# Patient Record
Sex: FEMALE | Race: ASIAN | NOT HISPANIC OR LATINO | Employment: FULL TIME | URBAN - METROPOLITAN AREA
[De-identification: names, ages, dates, MRNs, and addresses within clinical notes are randomized per-mention and may not be internally consistent; named-entity substitution may affect disease eponyms.]

---

## 2017-01-30 ENCOUNTER — MYC MEDICAL ADVICE (OUTPATIENT)
Dept: FAMILY MEDICINE | Facility: CLINIC | Age: 22
End: 2017-01-30

## 2017-01-30 DIAGNOSIS — R21 FACIAL RASH: Primary | ICD-10-CM

## 2017-02-01 NOTE — TELEPHONE ENCOUNTER
Gayathri,   Please see Hiptypet message below and advise if you would like pt to be seen, or if you can provide allergy referral.     Mayte Jenkins RN  [facility]

## 2017-02-07 ENCOUNTER — TRANSFERRED RECORDS (OUTPATIENT)
Dept: HEALTH INFORMATION MANAGEMENT | Facility: CLINIC | Age: 22
End: 2017-02-07

## 2017-02-23 ENCOUNTER — OFFICE VISIT (OUTPATIENT)
Dept: FAMILY MEDICINE | Facility: CLINIC | Age: 22
End: 2017-02-23
Payer: COMMERCIAL

## 2017-02-23 VITALS
WEIGHT: 148 LBS | OXYGEN SATURATION: 99 % | DIASTOLIC BLOOD PRESSURE: 80 MMHG | TEMPERATURE: 97.4 F | HEART RATE: 61 BPM | BODY MASS INDEX: 24.66 KG/M2 | SYSTOLIC BLOOD PRESSURE: 111 MMHG

## 2017-02-23 DIAGNOSIS — R07.0 THROAT PAIN: Primary | ICD-10-CM

## 2017-02-23 LAB
DEPRECATED S PYO AG THROAT QL EIA: NORMAL
HETEROPH AB SER QL: NEGATIVE
MICRO REPORT STATUS: NORMAL
SPECIMEN SOURCE: NORMAL

## 2017-02-23 PROCEDURE — 36415 COLL VENOUS BLD VENIPUNCTURE: CPT | Performed by: NURSE PRACTITIONER

## 2017-02-23 PROCEDURE — 87081 CULTURE SCREEN ONLY: CPT | Performed by: NURSE PRACTITIONER

## 2017-02-23 PROCEDURE — 87880 STREP A ASSAY W/OPTIC: CPT | Performed by: NURSE PRACTITIONER

## 2017-02-23 PROCEDURE — 99213 OFFICE O/P EST LOW 20 MIN: CPT | Performed by: NURSE PRACTITIONER

## 2017-02-23 PROCEDURE — 86308 HETEROPHILE ANTIBODY SCREEN: CPT | Performed by: NURSE PRACTITIONER

## 2017-02-23 NOTE — PROGRESS NOTES
SUBJECTIVE:                                                    Jed Krishna is a 21 year old female who presents to clinic today for the following health issues:      Acute Illness   Acute illness concerns: sore throat  Onset: 1 week    Fever: no     Chills/Sweats: YES- sweats    Headache (location?): YES    Sinus Pressure:YES    Conjunctivitis:  no    Ear Pain: YES- on and off    Rhinorrhea: no     Congestion: no     Sore Throat: YES     Cough: YES    Wheeze: no     Decreased Appetite: YES    Nausea: no     Vomiting: no     Diarrhea:  no     Dysuria/Freq.: no     Fatigue/Achiness: YES    Sick/Strep Exposure: no      Therapies Tried and outcome: Advil and Nyquil       Problem list and histories reviewed & adjusted, as indicated.  Additional history: as documented    Problem list, Medication list, Allergies, and Medical/Social/Surgical histories reviewed in EPIC and updated as appropriate.    ROS:  ROS:5 point ROS including CONST, HEENT, Respiratory, CV, and GI other than that noted in the HPI,  is negative       OBJECTIVE:                                                    /80 (BP Location: Right arm, Patient Position: Chair, Cuff Size: Adult Regular)  Pulse 61  Temp 97.4  F (36.3  C) (Oral)  Wt 148 lb (67.1 kg)  SpO2 99%  BMI 24.66 kg/m2  Body mass index is 24.66 kg/(m^2).  GENERAL: alert and fatigued  EYES: Eyes grossly normal to inspection, PERRL and conjunctivae and sclerae normal  HENT: normal cephalic/atraumatic, both ears: clear effusion, nose and mouth without ulcers or lesions, nasal mucosa edematous , oropharynx clear, oral mucous membranes moist, tonsillar hypertrophy and tonsillar erythema  NECK: bilateral anterior and posterior cervical adenopathy, no asymmetry, masses, or scars and thyroid normal to palpation  RESP: lungs clear to auscultation - no rales, rhonchi or wheezes  CV: regular rate and rhythm, normal S1 S2, no S3 or S4, no murmur, click or rub, no peripheral edema and peripheral  pulses strong    Diagnostic Test Results:  Results for orders placed or performed in visit on 02/23/17 (from the past 24 hour(s))   Strep, Rapid Screen   Result Value Ref Range    Specimen Description Throat     Rapid Strep A Screen       NEGATIVE: No Group A streptococcal antigen detected by immunoassay, await   culture report.      Micro Report Status FINAL 02/23/2017    Mononucleosis screen   Result Value Ref Range    Mononucleosis Screen Negative NEG        ASSESSMENT/PLAN:                                                      (R07.0) Throat pain  (primary encounter diagnosis)  Comment:   Plan: Strep, Rapid Screen, Mononucleosis screen, Beta        strep group A culture              Patient Instructions   1.  Push fluids - drink 80 oz a day  2.  Saline nasal rinse - saline spray or rinse like a neti pot  3.  Humidifier - could put eucalyptus or peppermint or rosemary essential oil in the humidifier water  4.  Steamy bathroom  - breathe in the steamy air  5.  Peg's Soother - Tea Source or at [employer] bulk    Whole 30 or alternative elimination diet      WIL Pérez [facility]

## 2017-02-23 NOTE — PATIENT INSTRUCTIONS
1.  Push fluids - drink 80 oz a day  2.  Saline nasal rinse - saline spray or rinse like a neti pot  3.  Humidifier - could put eucalyptus or peppermint or rosemary essential oil in the humidifier water  4.  Steamy bathroom  - breathe in the steamy air  5.  Peg's Soother - Tea Source or at [employer] bulk    Whole 30 or alternative elimination diet

## 2017-02-23 NOTE — MR AVS SNAPSHOT
After Visit Summary   2/23/2017    Jed Krishna    MRN: [id number]           Patient Information     Date Of Birth          1995        Visit Information        Provider Department      2/23/2017 3:45 PM Open, Assignments; Adeline Horta APRN CNP [facility] [place] [facility]        Today's Diagnoses     Throat pain    -  1      Care Instructions    1.  Push fluids - drink 80 oz a day  2.  Saline nasal rinse - saline spray or rinse like a neti pot  3.  Humidifier - could put eucalyptus or peppermint or rosemary essential oil in the humidifier water  4.  Steamy bathroom  - breathe in the steamy air  5.  Peg's Soother - Tea Source or at [employer] bulk    Whole 30 or alternative elimination diet        Follow-ups after your visit        Who to contact     If you have questions or need follow up information about today's clinic visit or your schedule please contact [facility] directly at [phone].  Normal or non-critical lab and imaging results will be communicated to you by Red Arilhart, letter or phone within 4 business days after the clinic has received the results. If you do not hear from us within 7 days, please contact the clinic through [employer] or phone. If you have a critical or abnormal lab result, we will notify you by phone as soon as possible.  Submit refill requests through uBeam or call your pharmacy and they will forward the refill request to us. Please allow 3 business days for your refill to be completed.          Additional Information About Your Visit        Red Arilhart Information     uBeam gives you secure access to your electronic health record. If you see a primary care provider, you can also send messages to your care team and make appointments. If you have questions, please call your primary care clinic.  If you do not have a primary care provider, please call [phone] and they will assist you.        Care EveryWhere ID     This is your Care  EveryWhere ID. This could be used by other organizations to access your [place] medical records  [id number]        Your Vitals Were     Pulse Temperature Pulse Oximetry BMI (Body Mass Index)          61 97.4  F (36.3  C) (Oral) 99% 24.66 kg/m2         Blood Pressure from Last 3 Encounters:   02/23/17 111/80   11/03/16 100/61   10/09/16 102/60    Weight from Last 3 Encounters:   02/23/17 148 lb (67.1 kg)   11/03/16 138 lb 4.8 oz (62.7 kg)   10/09/16 140 lb (63.5 kg)              We Performed the Following     Beta strep group A culture     Mononucleosis screen     Strep, Rapid Screen        Primary Care Provider    None Specified       No primary provider on file.        Thank you!     Thank you for choosing [facility]  for your care. Our goal is always to provide you with excellent care. Hearing back from our patients is one way we can continue to improve our services. Please take a few minutes to complete the written survey that you may receive in the mail after your visit with us. Thank you!             Your Updated Medication List - Protect others around you: Learn how to safely use, store and throw away your medicines at www.disposemymeds.org.      Notice  As of 2/23/2017  4:42 PM    You have not been prescribed any medications.

## 2017-02-23 NOTE — LETTER
[facility]  [address] [place] [address]  Phone: [phone]  Fax: [phone]    February 23, 2017        Jed Krishna  [address] [facility] [address]          To whom it may concern:    RE: Jed Krishna    Patient was seen and treated today at our clinic and missed school    Please contact me for questions or concerns.      Sincerely,        WIL Pérez CNP

## 2017-02-25 LAB
BACTERIA SPEC CULT: NORMAL
MICRO REPORT STATUS: NORMAL
SPECIMEN SOURCE: NORMAL

## 2017-03-16 ENCOUNTER — OFFICE VISIT (OUTPATIENT)
Dept: MIDWIFE SERVICES | Facility: CLINIC | Age: 22
End: 2017-03-16
Payer: COMMERCIAL

## 2017-03-16 VITALS
HEART RATE: 65 BPM | WEIGHT: 143 LBS | DIASTOLIC BLOOD PRESSURE: 63 MMHG | BODY MASS INDEX: 23.83 KG/M2 | SYSTOLIC BLOOD PRESSURE: 99 MMHG

## 2017-03-16 DIAGNOSIS — Z30.09 BIRTH CONTROL COUNSELING: Primary | ICD-10-CM

## 2017-03-16 DIAGNOSIS — Z00.00 ENCOUNTER FOR ROUTINE ADULT HEALTH EXAMINATION WITHOUT ABNORMAL FINDINGS: ICD-10-CM

## 2017-03-16 LAB — BETA HCG QUAL IFA URINE: NEGATIVE

## 2017-03-16 PROCEDURE — 84703 CHORIONIC GONADOTROPIN ASSAY: CPT | Performed by: ADVANCED PRACTICE MIDWIFE

## 2017-03-16 PROCEDURE — 99201 ZZC OFFICE/OUTPT VISIT, NEW, LEVEL I: CPT | Performed by: ADVANCED PRACTICE MIDWIFE

## 2017-03-16 RX ORDER — MULTIPLE VITAMINS W/ MINERALS TAB 9MG-400MCG
1 TAB ORAL EVERY MORNING
Qty: 100 TABLET | Refills: 0 | Status: ON HOLD | COMMUNITY
Start: 2017-03-16 | End: 2023-06-19

## 2017-03-16 NOTE — NURSING NOTE
"Chief Complaint   Patient presents with     IUD       Initial BP 99/63  Pulse 65  Wt 143 lb (64.9 kg)  LMP 03/09/2017  Breastfeeding? No  BMI 23.83 kg/m2 Estimated body mass index is 23.83 kg/(m^2) as calculated from the following:    Height as of 11/3/16: 5' 4.96\" (1.65 m).    Weight as of this encounter: 143 lb (64.9 kg).  BP completed using cuff size: regular    No obstetric history on file.    The following HM Due: NONE      The following patient reported/Care Every where data was sent to:  P ABSTRACT QUALITY INITIATIVES [[id number]]  na     n/a             "

## 2017-03-16 NOTE — MR AVS SNAPSHOT
After Visit Summary   3/16/2017    Jed Krishna    MRN: [id number]           Patient Information     Date Of Birth          1995        Visit Information        Provider Department      3/16/2017 11:00 AM Marta Mccollum APRN [facility] [place]        Today's Diagnoses     Birth control counseling    -  1    Encounter for routine adult health examination without abnormal findings           Follow-ups after your visit        Who to contact     If you have questions or need follow up information about today's clinic visit or your schedule please contact [facility] directly at [phone].  Normal or non-critical lab and imaging results will be communicated to you by BlackDuckhart, letter or phone within 4 business days after the clinic has received the results. If you do not hear from us within 7 days, please contact the clinic through Pictoramat or phone. If you have a critical or abnormal lab result, we will notify you by phone as soon as possible.  Submit refill requests through SumoSkinny or call your pharmacy and they will forward the refill request to us. Please allow 3 business days for your refill to be completed.          Additional Information About Your Visit        MyChart Information     SumoSkinny gives you secure access to your electronic health record. If you see a primary care provider, you can also send messages to your care team and make appointments. If you have questions, please call your primary care clinic.  If you do not have a primary care provider, please call [phone] and they will assist you.        Care EveryWhere ID     This is your Care EveryWhere ID. This could be used by other organizations to access your [place] medical records  [id number]        Your Vitals Were     Pulse Last Period Breastfeeding? BMI (Body Mass Index)          65 03/09/2017 No 23.83 kg/m2         Blood Pressure from Last 3 Encounters:   03/16/17 99/63   02/23/17 111/80    11/03/16 100/61    Weight from Last 3 Encounters:   03/16/17 143 lb (64.9 kg)   02/23/17 148 lb (67.1 kg)   11/03/16 138 lb 4.8 oz (62.7 kg)              We Performed the Following     Beta HCG qual IFA urine        Primary Care Provider    None Specified       No primary provider on file.        Thank you!     Thank you for choosing [facility]  for your care. Our goal is always to provide you with excellent care. Hearing back from our patients is one way we can continue to improve our services. Please take a few minutes to complete the written survey that you may receive in the mail after your visit with us. Thank you!             Your Updated Medication List - Protect others around you: Learn how to safely use, store and throw away your medicines at www.disposemymeds.org.          This list is accurate as of: 3/16/17 11:26 AM.  Always use your most recent med list.                   Brand Name Dispense Instructions for use    Multi-vitamin Tabs tablet     100 tablet    Take 1 tablet by mouth daily

## 2017-03-16 NOTE — PROGRESS NOTES
S:  Jed Krishna is a 21 year old  who presents today to discuss options for birth control. She is most interested in the IUDs but not sure which IUD she would like to use. Discussed Evie, Mirena, and Copper IUD. She is leaning more towards the Sklya IUD. She is worried about cramping today and mentioned needing to do more rigorous exercise tonight. Also forgot to take Ibuprofen and is not currently on her menses. Discussed we can attempt IUD today but usually there is more success when on menses. She wants to think more about the Evie anyway. Will plan to make an appointment in the next two weeks when her menses returns. She has no other questions or concerns. She was seen by family practice in November for annual exam. Updated medical chart today. Not due for pap with IUD placement.     O:  BP 99/63  Pulse 65  Wt 143 lb (64.9 kg)  LMP 2017  Breastfeeding? No  BMI 23.83 kg/m2  Patient appears well    A:  Birth control counseling     P:  Will return for Evie IUD when on menses.     Marta Mccollum CNM

## 2017-07-06 ENCOUNTER — OFFICE VISIT (OUTPATIENT)
Dept: FAMILY MEDICINE | Facility: CLINIC | Age: 22
End: 2017-07-06

## 2017-07-06 VITALS
SYSTOLIC BLOOD PRESSURE: 106 MMHG | TEMPERATURE: 97.4 F | WEIGHT: 142.1 LBS | DIASTOLIC BLOOD PRESSURE: 66 MMHG | HEART RATE: 63 BPM | BODY MASS INDEX: 23.68 KG/M2 | OXYGEN SATURATION: 98 %

## 2017-07-06 DIAGNOSIS — L30.9 DERMATITIS: Primary | ICD-10-CM

## 2017-07-06 PROCEDURE — 99213 OFFICE O/P EST LOW 20 MIN: CPT | Performed by: NURSE PRACTITIONER

## 2017-07-06 RX ORDER — TRIAMCINOLONE ACETONIDE 1 MG/G
CREAM TOPICAL
Qty: 30 G | Refills: 0 | Status: SHIPPED | OUTPATIENT
Start: 2017-07-06 | End: 2023-08-08

## 2017-07-06 NOTE — PROGRESS NOTES
SUBJECTIVE:                                                    Jed Krishna is a 21 year old female who presents to clinic today for the following health issues:    Rash  Onset: 1 week ago    Description:   Location: Under left breast   Character: red, raised, and bumps  Itching (Pruritis): YES    Progression of Symptoms:  worsening    Accompanying Signs & Symptoms:  Fever: no   Body aches or joint pain: no   Sore throat symptoms: no   Recent cold symptoms: no     History:   Previous similar rash: YES    Precipitating factors:   Exposure to similar rash: no   New exposures: None   Recent travel: out of state only - in [place] - bit by bugs in different locations than the rash    Alleviating factors:  None    Therapies Tried and outcome: Neosporin, and hydrocortisone and didn't seem to help at all    Questions/Concerns: None    Problem list and histories reviewed & adjusted, as indicated.  Additional history: as documented    Reviewed and updated as needed this visit by clinical staff  Tobacco  Allergies  Meds  Med Hx  Surg Hx  Fam Hx  Soc Hx      Reviewed and updated as needed this visit by Provider         ROS:  C: NEGATIVE for fever, chills, change in weight  E/M: NEGATIVE for ear, mouth and throat problems  R: NEGATIVE for significant cough or SOB  CV: NEGATIVE for chest pain, palpitations or peripheral edema    OBJECTIVE:     /66  Pulse 63  Temp 97.4  F (36.3  C) (Oral)  Wt 142 lb 1.6 oz (64.5 kg)  LMP 06/26/2017 (Exact Date)  SpO2 98%  BMI 23.68 kg/m2  Body mass index is 23.68 kg/(m^2).  GENERAL: healthy, alert and no distress  NECK: no adenopathy, no asymmetry, masses, or scars and thyroid normal to palpation  RESP: lungs clear to auscultation - no rales, rhonchi or wheezes  CV: regular rate and rhythm, normal S1 S2, no S3 or S4, no murmur, click or rub, no peripheral edema and peripheral pulses strong  SKIN: 6 cm x3 cm oval deep red papules on erythematous base - cluster under left  breast    Diagnostic Test Results:  none     ASSESSMENT/PLAN:     (L30.9) Dermatitis  (primary encounter diagnosis)  Comment:   Plan: triamcinolone (KENALOG) 0.1 % cream              Patient Instructions   Cream on the rash for the next couple days  Draw around rash - if bigger you will know  If fever, chills, new other symptoms please let me know  If no improvement, please let me know      WIL Pérez [facility]

## 2017-07-06 NOTE — PATIENT INSTRUCTIONS
Cream on the rash for the next couple days  Draw around rash - if bigger you will know  If fever, chills, new other symptoms please let me know  If no improvement, please let me know

## 2017-07-06 NOTE — MR AVS SNAPSHOT
After Visit Summary   7/6/2017    Jed Krishna    MRN: [id number]           Patient Information     Date Of Birth          1995        Visit Information        Provider Department      7/6/2017 2:15 PM Adeline Horta APRN CNP [facility]        Today's Diagnoses     Dermatitis    -  1      Care Instructions    Cream on the rash for the next couple days  Draw around rash - if bigger you will know  If fever, chills, new other symptoms please let me know  If no improvement, please let me know          Follow-ups after your visit        Who to contact     If you have questions or need follow up information about today's clinic visit or your schedule please contact [facility] directly at [phone].  Normal or non-critical lab and imaging results will be communicated to you by Hakiahart, letter or phone within 4 business days after the clinic has received the results. If you do not hear from us within 7 days, please contact the clinic through Hakiahart or phone. If you have a critical or abnormal lab result, we will notify you by phone as soon as possible.  Submit refill requests through [employer] or call your pharmacy and they will forward the refill request to us. Please allow 3 business days for your refill to be completed.          Additional Information About Your Visit        MyChart Information     [employer] gives you secure access to your electronic health record. If you see a primary care provider, you can also send messages to your care team and make appointments. If you have questions, please call your primary care clinic.  If you do not have a primary care provider, please call [phone] and they will assist you.        Care EveryWhere ID     This is your Care EveryWhere ID. This could be used by other organizations to access your [place] medical records  [id number]        Your Vitals Were     Pulse Temperature Last Period Pulse Oximetry BMI (Body Mass  Index)       63 97.4  F (36.3  C) (Oral) 06/26/2017 (Exact Date) 98% 23.68 kg/m2        Blood Pressure from Last 3 Encounters:   07/06/17 106/66   03/16/17 99/63   02/23/17 111/80    Weight from Last 3 Encounters:   07/06/17 142 lb 1.6 oz (64.5 kg)   03/16/17 143 lb (64.9 kg)   02/23/17 148 lb (67.1 kg)              Today, you had the following     No orders found for display         Today's Medication Changes          These changes are accurate as of: 7/6/17  3:25 PM.  If you have any questions, ask your nurse or doctor.               Start taking these medicines.        Dose/Directions    triamcinolone 0.1 % cream   Commonly known as:  KENALOG   Used for:  Dermatitis   Started by:  Adeline Horta APRN CNP        Apply sparingly to affected area three times daily for 14 days.   Quantity:  30 g   Refills:  0            Where to get your medicines      These medications were sent to [place] [id number] [place] [id number]   [address] [facility] [address]     Phone:  [phone]     triamcinolone 0.1 % cream                Primary Care Provider    None Specified       No primary provider on file.        Equal Access to Services     WINDY ALANIS AH: Jeffrey Hlot, wadoda luliudmilaadaha, qaybta kaalmada adeclaudioyada, priti augustine. So [facility] [phone].    ATENCIÓN: Si habla español, tiene a chi disposición servicios gratuitos de asistencia lingüística. Llame al [phone].    We comply with applicable federal civil rights laws and [place] laws. We do not discriminate on the basis of race, color, national origin, age, disability sex, sexual orientation or gender identity.            Thank you!     Thank you for choosing [facility]  for your [place]. Our goal is always to provide you with excellent care. Hearing back from our patients is one way we can continue to improve our services. Please take a few minutes to complete the written survey  that you may receive in the mail after your visit with us. Thank you!             Your Updated Medication List - Protect others around you: Learn how to safely use, store and throw away your medicines at www.disposemymeds.org.          This list is accurate as of: 7/6/17  3:25 PM.  Always use your most recent med list.                   Brand Name Dispense Instructions for use Diagnosis    Multi-vitamin Tabs tablet     100 tablet    Take 1 tablet by mouth daily    Encounter for routine adult health examination without abnormal findings       triamcinolone 0.1 % cream    KENALOG    30 g    Apply sparingly to affected area three times daily for 14 days.    Dermatitis

## 2017-07-06 NOTE — NURSING NOTE
"Chief Complaint   Patient presents with     Derm Problem       Initial /66  Pulse 63  Temp 97.4  F (36.3  C) (Oral)  Wt 142 lb 1.6 oz (64.5 kg)  LMP 06/26/2017 (Exact Date)  SpO2 98%  BMI 23.68 kg/m2 Estimated body mass index is 23.68 kg/(m^2) as calculated from the following:    Height as of 11/3/16: 5' 4.96\" (1.65 m).    Weight as of this encounter: 142 lb 1.6 oz (64.5 kg).  Medication Reconciliation: complete     Edmund Sales MA      "

## 2019-11-07 ENCOUNTER — HEALTH MAINTENANCE LETTER (OUTPATIENT)
Age: 24
End: 2019-11-07

## 2020-02-17 ENCOUNTER — HEALTH MAINTENANCE LETTER (OUTPATIENT)
Age: 25
End: 2020-02-17

## 2020-06-10 ENCOUNTER — APPOINTMENT (OUTPATIENT)
Dept: URGENT CARE | Facility: URGENT CARE | Age: 25
End: 2020-06-10
Payer: COMMERCIAL

## 2020-06-10 ENCOUNTER — RESULTS ONLY (OUTPATIENT)
Dept: LAB | Age: 25
End: 2020-06-10

## 2020-06-10 LAB
SARS-COV-2 RNA SPEC QL NAA+PROBE: NOT DETECTED
SPECIMEN SOURCE: NORMAL

## 2020-11-25 ENCOUNTER — E-VISIT (OUTPATIENT)
Dept: URGENT CARE | Facility: URGENT CARE | Age: 25
End: 2020-11-25
Payer: COMMERCIAL

## 2020-11-25 DIAGNOSIS — J02.9 SORE THROAT: ICD-10-CM

## 2020-11-25 DIAGNOSIS — Z20.822 SUSPECTED COVID-19 VIRUS INFECTION: ICD-10-CM

## 2020-11-25 LAB
DEPRECATED S PYO AG THROAT QL EIA: NEGATIVE
SPECIMEN SOURCE: NORMAL

## 2020-11-25 PROCEDURE — 99421 OL DIG E/M SVC 5-10 MIN: CPT | Performed by: PREVENTIVE MEDICINE

## 2020-11-25 PROCEDURE — 87651 STREP A DNA AMP PROBE: CPT | Performed by: PREVENTIVE MEDICINE

## 2020-11-25 PROCEDURE — 99N1174 PR STATISTIC STREP A RAPID: Performed by: PREVENTIVE MEDICINE

## 2020-11-25 PROCEDURE — U0003 INFECTIOUS AGENT DETECTION BY NUCLEIC ACID (DNA OR RNA); SEVERE ACUTE RESPIRATORY SYNDROME CORONAVIRUS 2 (SARS-COV-2) (CORONAVIRUS DISEASE [COVID-19]), AMPLIFIED PROBE TECHNIQUE, MAKING USE OF HIGH THROUGHPUT TECHNOLOGIES AS DESCRIBED BY CMS-2020-01-R: HCPCS | Performed by: PREVENTIVE MEDICINE

## 2020-11-25 NOTE — PATIENT INSTRUCTIONS
Dear Jed Krishna,    Your symptoms show that you may have coronavirus (COVID-19). This illness can cause fever, cough and trouble breathing. Many people get a mild case and get better on their own. Some people can get very sick.    Because you also reported sore throat I would like to also test you for Strep Throat to determine if we need to treat you for that as well.    What should I do?  We would like to test you for Covid-19 virus and Strep Throat. I have placed orders for these tests.     For all employees or close contacts (except [employer] and [employer] - see below), go to your [employer] home page and scroll down to the section that says  You have an appointment that needs to be scheduled  and click the large green button that says  Schedule Now  and follow the steps to find the next available opening. PLEASE Mention when asked what you are scheduling for that you need BOTH Covid and Strep tests.   If you are unable to complete these steps or if you cannot find any available times, please call [phone] to schedule employee testing.       [employer] employees or close contacts, please call [phone].   [employer] employees or close contacts call [phone].      When it's time for your COVID and Strep test:  Stay at least 6 feet away from others. (If someone will drive you to your test, stay in the backseat, as far away from the  as you can.)  Cover your mouth and nose with a mask, tissue or washcloth.  Go straight to the testing site. Don't make any stops on the way there or back.    Starting now:     Do not go to work.   o If you receive a negative COVID-19 test result and were NOT exposed to someone with a known positive COVID-19 test, you can return to work once you're free of fever for 24 hours without fever-reducing medication and your symptoms are improving or resolved.  o If you receive a positive COVID-19 test result, you must be cleared by Employee Occupational Health and  "Safety to return to work.   o If you were exposed to someone who has tested positive for COVID-19, you can return to work 14 days after your last contact with the positive individual, provided you do not have symptoms at all during that time. In some cases, your manager may ask you to come back sooner than 14 days.     During this time, don't leave the house except for testing or medical care.  o Stay in your own room, even for meals. Use your own bathroom if you can.  o Stay away from others in your home. No hugging, kissing or shaking hands. No visitors.  o Don't go to work, school or anywhere else.    Clean \"high touch\" surfaces often (doorknobs, counters, handles, etc.). Use a household cleaning spray or wipes. You'll find a full list of  on the EPA website: www.epa.gov/pesticide-registration/list-n-disinfectants-use-against-sars-cov-2.    Cover your mouth and nose with a mask, tissue or washcloth to avoid spreading germs.    Wash your hands and face often. Use soap and water.    People in these groups are at risk for severe illness due to COVID-19:  o People 65 years and older  o People who live in a nursing home or long-term care facility  o People with chronic disease (lung, heart, cancer, diabetes, kidney, liver, immunologic)  o People who have a weakened immune system, including those who:  - Are in cancer treatment  - Take medicine that weakens the immune system, such as corticosteroids  - Had a bone marrow or organ transplant  - Have an immune deficiency  - Have poorly controlled HIV or AIDS  - Are obese (body mass index of 40 or higher)  - Smoke regularly      Caregivers should wear gloves while washing dishes, handling laundry and cleaning bedrooms and bathrooms.    Use caution when washing and drying laundry: Don't shake dirty laundry, and use the warmest water setting that you can.    For more tips, go to www.cdc.gov/coronavirus/2019-ncov/downloads/10Things.pdf.    Sign up for Bladimir Hernandez. We " know it's scary to hear that you might have COVID-19. We want to track your symptoms to make sure you're okay over the next 2 weeks. Please look for an email from [employer]--this is a free, online program that we'll use to keep in touch. To sign up, follow the link in the email you will receive. Learn more at [email or web address]    How can I take care of myself?    Get lots of rest. Drink extra fluids (unless a doctor has told you not to)    Take Tylenol (acetaminophen) for fever or pain. If you have liver or kidney problems, ask your family doctor if it's okay to take Tylenol.  Adults can take either:    650 mg (two 325 mg pills) every 4 to 6 hours, or     1,000 mg (two 500 mg pills) every 8 hours as needed.    Note: Don't take more than 3,000 mg in one day. Acetaminophen is found in many medicines (both prescribed and over-the-counter medicines). Read all labels to be sure you don't take too much.  For children, check the Tylenol bottle for the right dose. The dose is based on the child's age or weight.    If you have other health problems (like cancer, heart failure, an organ transplant or severe kidney disease): Call your specialty clinic if you don't feel better in the next 2 days.    Know when to call 911. Emergency warning signs include:  Trouble breathing or shortness of breath  Pain or pressure in the chest that doesn't go away  Feeling confused like you haven't felt before, or not being able to wake up  Bluish-colored lips or face  Where can I get more information?    [facility] - About COVID-19: [email or web address]    CDC - What to Do If You're Sick: www.cdc.gov/coronavirus/2019-ncov/about/steps-when-sick.html

## 2020-11-26 LAB
SPECIMEN SOURCE: NORMAL
STREP GROUP A PCR: NOT DETECTED

## 2020-11-28 LAB
SARS-COV-2 RNA SPEC QL NAA+PROBE: NOT DETECTED
SPECIMEN SOURCE: NORMAL

## 2020-11-29 ENCOUNTER — HEALTH MAINTENANCE LETTER (OUTPATIENT)
Age: 25
End: 2020-11-29

## 2021-09-25 ENCOUNTER — HEALTH MAINTENANCE LETTER (OUTPATIENT)
Age: 26
End: 2021-09-25

## 2022-01-15 ENCOUNTER — HEALTH MAINTENANCE LETTER (OUTPATIENT)
Age: 27
End: 2022-01-15

## 2022-12-26 ENCOUNTER — HEALTH MAINTENANCE LETTER (OUTPATIENT)
Age: 27
End: 2022-12-26

## 2023-04-10 ENCOUNTER — TRANSCRIBE ORDERS (OUTPATIENT)
Dept: OTHER | Age: 28
End: 2023-04-10

## 2023-04-10 DIAGNOSIS — R61 HYPERHIDROSIS: Primary | ICD-10-CM

## 2023-04-11 ENCOUNTER — PRE VISIT (OUTPATIENT)
Dept: ONCOLOGY | Facility: CLINIC | Age: 28
End: 2023-04-11
Payer: COMMERCIAL

## 2023-04-11 NOTE — TELEPHONE ENCOUNTER
Action    Action Taken 4/11/23  [facility] for pt re: recs call/[facility] CE Pull  11:00 AM    Pt called back, provided verbal permission to pull CE records. Records from CE now viewable. Pt advised they also met w/ a PCP @ [facility] - records now viewable to CE.     Pt advised all care @ [facility], [facility] & [facility]  3:18 PM

## 2023-04-12 ENCOUNTER — PRE VISIT (OUTPATIENT)
Dept: ONCOLOGY | Facility: CLINIC | Age: 28
End: 2023-04-12
Payer: COMMERCIAL

## 2023-04-12 ENCOUNTER — PATIENT OUTREACH (OUTPATIENT)
Dept: ONCOLOGY | Facility: CLINIC | Age: 28
End: 2023-04-12
Payer: COMMERCIAL

## 2023-04-12 NOTE — PROGRESS NOTES
Review of self-referral to Thoracic Surgery for hyperhidrosis.    I left VM on pt's cell to call back and discuss her history with hyperhidrosis. Awaiting call back.      Pt has consulted with Dr Roa (Derm [facility]) regarding management of hyperhidrosis, last visit 1/15/2020. Various Care Everywhere notes by Dr Roa indicates that pt has tried many different medications and devices to tx hyperhidrosis (alumimun chloride, Aquex Daavlin device, Glycopyrrolate, Qbrexa wipes/glycopyrronium). Last note indicates that pt will try iontophoresis vs botox but unclear if pt actually tried these modalities.    Recommend that pt may see Thoracic Surgery next available per pt preference; also a good idea to reconnect with Derm team [facility]. New Intake team will call to offer appts.      Addendum 4/14: I spoke to pt, she reports that she has not tried botox yet. She would still like CTS consult to discuss their interpretation and learn more about surgical options. She will also reconnect with Dermatology.      Ramiro Murphy RN  Nurse Navigator  Thoracic Surgery  [facility]  [facility]  [phone]

## 2023-04-12 NOTE — TELEPHONE ENCOUNTER
Action    Action Taken 4/12/23  Pre visited created to access [facility] CE.  3:14 PM

## 2023-04-16 ENCOUNTER — HEALTH MAINTENANCE LETTER (OUTPATIENT)
Age: 28
End: 2023-04-16

## 2023-04-24 NOTE — PROGRESS NOTES
THORACIC SURGERY - NEW PATIENT OFFICE VISIT        I saw Jed Brewer in consultation for the evaluation and treatment of medically refractory hyperhidrosis    HPI  Jed Brewer is a 27 year old woman with a history of hyperhidrosis of palms, axillae and feet,  that has been medically refractory thus far. She has not tried botox yet, but wanted to explore surgical options as well.        She has tried alumimun chloride, Aquex Daavlin device, Glycopyrrolate, and Qbrexa wipes/glycopyrronium and iontophoresis. She is also considering botox but would prefer a more definitive solution     ECOG performance status  0- Fully active, without restriction                                   Covid vaccination status: Vaccinated      PMH    Anxiety and depression     Sees Dr. Woodard     Attention-deficit hyperactivity disorder, unspecified type     Sees Dr. Woodard     Constipation, unspecified      Hyperhidrosis      No past medical history on file.     PSH  [place] teeth extraction     No past surgical history on file.     Meds:    buPROPion XL (WELLBUTRIN XL) 150 MG XL tablet TAKE 1 TABLET BY MOUTH EVERY DAY     Current Outpatient Medications   Medication     multivitamin, therapeutic with minerals (MULTI-VITAMIN) TABS tablet     triamcinolone (KENALOG) 0.1 % cream     No current facility-administered medications for this visit.     Family History    Other (Type 2 diabetes) Mother      Anxiety disorder Mother      Bipolar disorder Mother      Other (Type 2 diabetes) Father      Depression Father      Hypertension Father      Other (Cardiovascular disease) Father      Other (Anxiety depression) Sister      Other (Anxiety depression) Sister      Other (Anxiety depression) Brother      Bipolar disorder Brother      Other (Anxiety depression) Brother      Other (Anxiety depression) Brother        Allergies  -Bee and Neosporin [bacitracin-polymyxin b]   No Known Allergies      ETOH: denies   TOBACCO: denies   OTHER DRUGS:        Physical examination  Vitals:  There were no vitals taken for this visit.        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 I reviewed the plan as follows:    Procedure planned: Procedure planned: Bilateral thoracoscopic sympathetic chain clipping at T4 andT5 . I reviewed the indications, risks, and benefits of the procedure with Ms. Jed Brewer . We discussed pain management, expected immediate control of focal hyperhidrosis, compensatory sweating (almost always mild), and the rare potential of heart rate changes that may or may not impact exercise ability. We also talked about potential recurrence over time (~5-15% at 5-10 years), which is possibly higher when surgery is performed in very young patients (<18 years old).  .    Necessary Preop Tests & Appointments:   PAC by PCP    Regional Anesthesia Plan: intra op intercostal block     Anticoagulation Plan: s/c heparin psot op       All questions were answered and Jed Brewer and present family were in agreement with the plan.  I appreciate the opportunity to participate in the care of your patient and will keep you updated.      Sincerely,

## 2023-04-24 NOTE — TELEPHONE ENCOUNTER
RECORDS STATUS - ALL OTHER DIAGNOSIS      RECORDS RECEIVED FROM: [facility], [facility] & [facility]   DATE RECEIVED: CE     Action April 24, 2023 10:36 AM RIA   Action Taken Per Previsit on 4.11, All recs in CE

## 2023-04-25 ENCOUNTER — PRE VISIT (OUTPATIENT)
Dept: SURGERY | Facility: CLINIC | Age: 28
End: 2023-04-25
Payer: COMMERCIAL

## 2023-04-25 ENCOUNTER — PREP FOR PROCEDURE (OUTPATIENT)
Dept: SURGERY | Facility: CLINIC | Age: 28
End: 2023-04-25

## 2023-04-25 ENCOUNTER — ONCOLOGY VISIT (OUTPATIENT)
Dept: SURGERY | Facility: CLINIC | Age: 28
End: 2023-04-25
Attending: STUDENT IN AN ORGANIZED HEALTH CARE EDUCATION/TRAINING PROGRAM
Payer: COMMERCIAL

## 2023-04-25 VITALS
DIASTOLIC BLOOD PRESSURE: 79 MMHG | HEART RATE: 89 BPM | RESPIRATION RATE: 16 BRPM | BODY MASS INDEX: 23.2 KG/M2 | WEIGHT: 135.9 LBS | SYSTOLIC BLOOD PRESSURE: 122 MMHG | HEIGHT: 64 IN | OXYGEN SATURATION: 98 % | TEMPERATURE: 98.3 F

## 2023-04-25 DIAGNOSIS — R61 HYPERHIDROSIS: Primary | ICD-10-CM

## 2023-04-25 PROCEDURE — G0463 HOSPITAL OUTPT CLINIC VISIT: HCPCS | Performed by: STUDENT IN AN ORGANIZED HEALTH CARE EDUCATION/TRAINING PROGRAM

## 2023-04-25 PROCEDURE — 99204 OFFICE O/P NEW MOD 45 MIN: CPT | Performed by: STUDENT IN AN ORGANIZED HEALTH CARE EDUCATION/TRAINING PROGRAM

## 2023-04-25 RX ORDER — BUPROPION HYDROCHLORIDE 150 MG/1
150 TABLET ORAL EVERY MORNING
COMMUNITY
Start: 2023-04-18 | End: 2023-08-08

## 2023-04-25 RX ORDER — HYDROXYZINE HYDROCHLORIDE 10 MG/1
TABLET, FILM COATED ORAL
COMMUNITY
Start: 2022-12-15

## 2023-04-25 ASSESSMENT — PAIN SCALES - GENERAL: PAINLEVEL: NO PAIN (0)

## 2023-04-25 NOTE — NURSING NOTE
"Oncology Rooming Note    April 25, 2023 2:31 PM   Jed Brewer is a 27 year old female who presents for:    Chief Complaint   Patient presents with     Oncology Clinic Visit     Hyperhidrosis      Initial Vitals: /79 (BP Location: Right arm, Patient Position: Sitting, Cuff Size: Adult Regular)   Pulse 89   Temp 98.3  F (36.8  C) (Oral)   Resp 16   Ht 1.62 m (5' 3.78\")   Wt 61.6 kg (135 lb 14.4 oz)   LMP 03/29/2023   SpO2 98%   BMI 23.49 kg/m   Estimated body mass index is 23.49 kg/m  as calculated from the following:    Height as of this encounter: 1.62 m (5' 3.78\").    Weight as of this encounter: 61.6 kg (135 lb 14.4 oz). Body surface area is 1.66 meters squared.  No Pain (0) Comment: Data Unavailable   Patient's last menstrual period was 03/29/2023.  Allergies reviewed: Yes  Medications reviewed: Yes    Medications: Medication refills not needed today.  Pharmacy name entered into [employer]: [id number] - [place], [place] - [address]    Clinical concerns: New patient consult for hyperhidrosis.        Ariadna Lawrence              "

## 2023-04-25 NOTE — LETTER
4/25/2023         RE: Jed Brewer  [address] [address] [facility] [address]        Dear Colleague,    Thank you for referring your patient, Jed Brewer, to the [facility]. Please see a copy of my visit note below.        THORACIC SURGERY - NEW PATIENT OFFICE VISIT        I saw Jde Brewer in consultation for the evaluation and treatment of medically refractory hyperhidrosis    HPI  Jed Brewer is a 27 year old woman with a history of hyperhidrosis of palms, axillae and feet,  that has been medically refractory thus far. She has not tried botox yet, but wanted to explore surgical options as well.        She has tried alumimun chloride, Aquex Daavlin device, Glycopyrrolate, and Qbrexa wipes/glycopyrronium and iontophoresis. She is also considering botox but would prefer a more definitive solution     ECOG performance status  0- Fully active, without restriction                                   Covid vaccination status: Vaccinated      PMH   Anxiety and depression     Sees Dr. Woodard    Attention-deficit hyperactivity disorder, unspecified type     Sees Dr. Woodard    Constipation, unspecified     Hyperhidrosis      No past medical history on file.     PSH  [place] teeth extraction     No past surgical history on file.     Meds:   buPROPion XL (WELLBUTRIN XL) 150 MG XL tablet TAKE 1 TABLET BY MOUTH EVERY DAY     Current Outpatient Medications   Medication    multivitamin, therapeutic with minerals (MULTI-VITAMIN) TABS tablet    triamcinolone (KENALOG) 0.1 % cream     No current facility-administered medications for this visit.     Family History   Other (Type 2 diabetes) Mother     Anxiety disorder Mother     Bipolar disorder Mother     Other (Type 2 diabetes) Father     Depression Father     Hypertension Father     Other (Cardiovascular disease) Father     Other (Anxiety depression) Sister     Other (Anxiety depression) Sister     Other (Anxiety depression) Brother     Bipolar  disorder Brother     Other (Anxiety depression) Brother     Other (Anxiety depression) Brother        Allergies  -Bee and Neosporin [bacitracin-polymyxin b]   No Known Allergies      ETOH: denies   TOBACCO: denies   OTHER DRUGS:       Physical examination  Vitals:  There were no vitals taken for this visit.        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 I reviewed the plan as follows:    Procedure planned: Procedure planned: Bilateral thoracoscopic sympathetic chain clipping at T4 andT5 . I reviewed the indications, risks, and benefits of the procedure with Ms. Jed Brewer . We discussed pain management, expected immediate control of focal hyperhidrosis, compensatory sweating (almost always mild), and the rare potential of heart rate changes that may or may not impact exercise ability. We also talked about potential recurrence over time (~5-15% at 5-10 years), which is possibly higher when surgery is performed in very young patients (<18 years old).  .    Necessary Preop Tests & Appointments:   PAC by PCP    Regional Anesthesia Plan: intra op intercostal block     Anticoagulation Plan: s/c heparin psot op       All questions were answered and Jed Brewer and present family were in agreement with the plan.  I appreciate the opportunity to participate in the care of your patient and will keep you updated.      Sincerely,          Mary Beth Castaneda MD

## 2023-05-08 ENCOUNTER — TELEPHONE (OUTPATIENT)
Dept: SURGERY | Facility: CLINIC | Age: 28
End: 2023-05-08
Payer: COMMERCIAL

## 2023-05-08 NOTE — TELEPHONE ENCOUNTER
Received voicemail from pt looking to schedule surgery with Dr. Castaneda. D and K interprisest message sent to pt, inbasket msg sent to surgery scheduler.    Beth Nelson, RN BSN  Thoracic Surgery RN Care Coordinator  [phone]

## 2023-05-17 ENCOUNTER — TELEPHONE (OUTPATIENT)
Dept: SURGERY | Facility: CLINIC | Age: 28
End: 2023-05-17
Payer: COMMERCIAL

## 2023-05-17 NOTE — TELEPHONE ENCOUNTER
Spoke with patient to schedule procedure with Dr. Castaneda   Procedure was scheduled on 6/30/23 at [facility]  Patient will have H&P with PAC    Patient is aware a COVID-19 test is needed before their procedure ONLY IF symptomatic.   (Patient is aware Thoracic is no longer requiring COVID-19 test)       Patient is aware a / is needed day of surgery.   Surgery Letter was sent via [employer],     Patient has my direct contact information for any further questions.     Called and offered pt 6/19 surgery date and she declined stating she'd prefer surgery on a Friday since she has work.

## 2023-05-17 NOTE — TELEPHONE ENCOUNTER
Called and left pt a detailed msg explaining pre Dr. Castaneda. she will not be available for this case on 6/30 and that pt would  need to move back to a Monday as previously offered. No answer, LVM with details req call back.    Babs Osullivan on 5/17/2023 at 2:05 PM

## 2023-05-17 NOTE — TELEPHONE ENCOUNTER
Received call back from pt who agreed to 6/19 surgery date.    Rescheduled PAC and POP.    Babs Osullivan on 5/17/2023 at 3:55 PM\

## 2023-05-25 NOTE — TELEPHONE ENCOUNTER
FUTURE VISIT INFORMATION      SURGERY INFORMATION:    Date: 6/19/23    Location: [facility]    Surgeon:  Mary Beth Castaneda MD    Anesthesia Type:  general    Procedure: SYMPATHECTOMY, SPINE, THORACIC, THORACOSCOPIC    RECORDS REQUESTED FROM:       Primary Care Provider: Micha Dumas [facility]

## 2023-06-12 ENCOUNTER — OFFICE VISIT (OUTPATIENT)
Dept: SURGERY | Facility: CLINIC | Age: 28
End: 2023-06-12
Payer: COMMERCIAL

## 2023-06-12 ENCOUNTER — PRE VISIT (OUTPATIENT)
Dept: SURGERY | Facility: CLINIC | Age: 28
End: 2023-06-12

## 2023-06-12 ENCOUNTER — ANESTHESIA EVENT (OUTPATIENT)
Dept: SURGERY | Facility: CLINIC | Age: 28
End: 2023-06-12
Payer: COMMERCIAL

## 2023-06-12 VITALS
HEIGHT: 64 IN | SYSTOLIC BLOOD PRESSURE: 107 MMHG | TEMPERATURE: 97.8 F | WEIGHT: 138.2 LBS | BODY MASS INDEX: 23.6 KG/M2 | RESPIRATION RATE: 16 BRPM | OXYGEN SATURATION: 100 % | HEART RATE: 79 BPM | DIASTOLIC BLOOD PRESSURE: 74 MMHG

## 2023-06-12 DIAGNOSIS — Z76.89 ENCOUNTER TO ESTABLISH CARE: ICD-10-CM

## 2023-06-12 DIAGNOSIS — R61 HYPERHIDROSIS: ICD-10-CM

## 2023-06-12 DIAGNOSIS — Z01.818 PRE-OP EVALUATION: Primary | ICD-10-CM

## 2023-06-12 PROCEDURE — 99203 OFFICE O/P NEW LOW 30 MIN: CPT | Performed by: PHYSICIAN ASSISTANT

## 2023-06-12 ASSESSMENT — PAIN SCALES - GENERAL: PAINLEVEL: NO PAIN (0)

## 2023-06-12 NOTE — PATIENT INSTRUCTIONS
Preparing for Your Surgery      Name:  Jed Brewer   MRN:  [id number]   :  1995   Today's Date:  2023       Arriving for surgery:  Surgery date: 23  Arrival time:  8.10AM    Please come to:     Please come to:      [facility] [address]  [address] [place]  [address]      The [facility] Patient /Visitor Ramp is located at [address]. Patients and visitors who self-park will receive the reduced hospital parking rate. If the Patient /Visitor Ramp is full, please follow the signs to the [employer] parking located at the main hospital entrance.     parking is available ( 24 hours/ 7 days a week)    Discounted parking pass options are available for patients and visitors. They can be purchased at the [employer] desk at the main hospital entrance.    -    Stop at the security desk and they will direct surgery patients to the 3rd floor Surgery Waiting Room. [phone]     -  If you are in need of directions, wheelchair or escort please stop at the Information/security desk in the lobby.       What can I eat or drink?  -  You may eat and drink normally up to 8 hours prior to arrival time. (Until 12.10AM)  -  You may have clear liquids until 2 hours prior to arrival time. (Until 6.10AM)    Examples of clear liquids:  Water  Clear broth  Juices (apple, white grape, white cranberry  and cider) without pulp  Noncarbonated, powder based beverages  (lemonade and Darrell-Aid)  Sodas (Sprite, 7-Up, ginger ale and seltzer)  Coffee or tea (without milk or cream)  Gatorade    -  No Alcohol or cannabis products for at least 24 hours before surgery.     Which medicines can I take?    Hold Aspirin for 7 days before surgery.   Hold Multivitamins for 7 days before surgery.  Hold Supplements for 7 days before surgery.  Hold Ibuprofen (Advil, Motrin) for 1 day(s) before surgery--unless otherwise directed by surgeon.  Hold Naproxen  (Aleve) for 4 days before surgery.    -  DO NOT take these medications the day of surgery:  Kenalog 0.1% cream.    -  PLEASE TAKE these medications the day of surgery:  Wellbutrin, Hydroxyzine (Atarax as needed).    How do I prepare myself?  - Please take 2 showers (one the night prior to surgery and one the morning of surgery) using Scrubcare or Hibiclens soap.    Use this soap only from the neck to your toes.     Leave the soap on your skin for one minute--then rinse thoroughly.      You may use your own shampoo and conditioner. No other hair products.   - Please remove all jewelry and body piercings.  - No lotions, deodorants or fragrance.  - No makeup or fingernail polish.   - Bring your ID and insurance card.    -If you have a Deep Brain Stimulator, Spinal Cord Stimulator, or any Neuro Stimulator device---you must bring the remote control to the hospital.      ALL PATIENTS GOING HOME THE SAME DAY OF SURGERY ARE REQUIRED TO HAVE A RESPONSIBLE ADULT TO DRIVE AND BE IN ATTENDANCE WITH THEM FOR 24 HOURS FOLLOWING SURGERY.    Covid testing policy as of 12/06/2022  Your surgeon will notify and schedule you for a COVID test if one is needed before surgery--please direct any questions or COVID symptoms to your surgeon      Questions or Concerns:    - For any questions regarding the day of surgery or your hospital stay, please contact the Pre Admission Nursing Office at [phone].       - If you have health changes between today and your surgery, please call your surgeon.       - For questions after surgery, please call your surgeons office.           Current Visitor Guidelines     Visiting hours: 8 a.m. to 8:30 p.m.     Patients confirmed or suspected to have symptoms of COVID 19 or flu:     No visitors allowed for adult patients.   Children (under age 18) can have 1 named visitor.     People who are sick or showing symptoms of COVID 19 or flu:    Are not allowed to visit patients--we can only make exceptions in  special situations.       Please follow these guidelines for your visit:          Please maintain social distance          Masking is optional--however at times you may be asked to wear a mask for the safety of yourself and others     Clean your hands with alcohol hand . Do this when you arrive at and leave the building and patient room,    And again after you touch your mask or anything in the room.     Go directly to and from the room you are visiting.     Stay in the patient s room during your visit. Limit going to other places in the hospital as much as possible     Leave bags and jackets at home or in the car.     For everyone s health, please don t come and go during your visit. That includes for smoking   during your visit.

## 2023-06-12 NOTE — H&P
Pre-Operative H & P     CC:  Preoperative exam to assess for increased cardiopulmonary risk while undergoing surgery and anesthesia.    Date of Encounter: 6/12/2023  Primary Care Physician:  No Ref-Primary, Physician     Reason for visit:   Encounter Diagnoses   Name Primary?     Pre-op evaluation Yes     Hyperhidrosis      Encounter to establish care        HPI  Jed Brewer is a 27 year old female who presents for pre-operative H & P in preparation for  Procedure Information     Case: [id number] Date/Time: 06/19/23 1010    Procedure: SYMPATHECTOMY, SPINE, THORACIC, THORACOSCOPIC (Bilateral: Trunk)    Anesthesia type: General    Diagnosis: Hyperhidrosis [R61]    Pre-op diagnosis: Hyperhidrosis [R61]    Location:  OR  /  OR    Providers: Mary Beth Castaneda MD        Patient is being evaluated for comorbid conditions of anxiety, depression, ADHD, migraines, and eczema.     She has hyperhidrosis of her hands, feet, axilla, and face. She has tried multiple non-surgical options without relief of her symptoms and was referred to Dr. Castaneda for ongoing management. She was seen in consultation on 4/25/2023 where treatment options were discussed and a plan was made to proceed with surgery as scheduled above.     History is obtained from the patient and chart review    Hx of abnormal bleeding or anti-platelet use: None    Menstrual history: Patient's last menstrual period was 05/30/2023 (within days).     Past Medical History  Past Medical History:   Diagnosis Date     ADHD (attention deficit hyperactivity disorder)      Anxiety      Depression      Eczema      Generalized hyperhidrosis      Migraine        Past Surgical History  Past Surgical History:   Procedure Laterality Date     DENTAL SURGERY      [place] teeth       Prior to Admission Medications  Current Outpatient Medications   Medication Sig Dispense Refill     buPROPion (WELLBUTRIN XL) 150 MG 24 hr tablet Take 150 mg by mouth every morning       hydrOXYzine (ATARAX)  10 MG tablet TAKE 1 TABLET BY MOUTH 3 TIMES A DAY AS NEEDED FOR ITCHING FOR UP TO 10 DAYS.       multivitamin, therapeutic with minerals (MULTI-VITAMIN) TABS tablet Take 1 tablet by mouth every morning 100 tablet 0     triamcinolone (KENALOG) 0.1 % cream Apply sparingly to affected area three times daily for 14 days. (Patient taking differently: Apply topically as needed Apply sparingly to affected area three times daily for 14 days.) 30 g 0       Allergies  Allergies   Allergen Reactions     Bacitracin-Polymyxin B Rash     Bee Venom Hives, Itching, Rash and Swelling     welts         Social History  Social History     Socioeconomic History     Marital status:      Spouse name: Not on file     Number of children: Not on file     Years of education: Not on file     Highest education level: Not on file   Occupational History     Not on file   Tobacco Use     Smoking status: Never     Passive exposure: Past     Smokeless tobacco: Never   Vaping Use     Vaping status: Not on file   Substance and Sexual Activity     Alcohol use: Not Currently     Comment: socially      Drug use: No     Sexual activity: Not Currently   Other Topics Concern     Parent/sibling w/ CABG, MI or angioplasty before 65F 55M? Not Asked   Social History Narrative     Not on file     Social Determinants of Health     Financial Resource Strain: Not on file   Food Insecurity: Not on file   Transportation Needs: Not on file   Physical Activity: Not on file   Stress: Not on file   Social Connections: Not on file   Intimate Partner Violence: Not on file   Housing Stability: Not on file       Family History  Family History   Problem Relation Age of Onset     Diabetes Mother      Anesthesia Reaction Father         Nausea, slow to wake     Venous thrombosis No family hx of      Bleeding Disorder No family hx of        Review of Systems  The complete review of systems is negative other than noted in the HPI or here.   Anesthesia Evaluation   Pt has  "had prior anesthetic.     No history of anesthetic complications       ROS/MED HX  ENT/Pulmonary:     (+) recent URI, resolved,  Cystic fibrosis: 3-4 weeks ago.   Neurologic:     (+) migraines,     Cardiovascular:  - neg cardiovascular ROS     METS/Exercise Tolerance: >4 METS Comment: HIIT 3 times per week   Hematologic:  - neg hematologic  ROS     Musculoskeletal: Comment: History of neck pain/stiff neck about 1 year ago. Improving with PT.       GI/Hepatic:  - neg GI/hepatic ROS  (-) GERD and liver disease   Renal/Genitourinary:  - neg Renal ROS     Endo:  - neg endo ROS     Psychiatric/Substance Use:     (+) psychiatric history anxiety, depression and other (comment) (ADHD)     Infectious Disease:  - neg infectious disease ROS     Malignancy:  - neg malignancy ROS     Other: Comment: Hyperhidrosis  Eczema    (-) Any chance pregnant       /74 (BP Location: Right arm, Patient Position: Sitting, Cuff Size: Adult Regular)   Pulse 79   Temp 97.8  F (36.6  C) (Oral)   Resp 16   Ht 1.621 m (5' 3.8\")   Wt 62.7 kg (138 lb 3.2 oz)   LMP 05/30/2023 (Within Days)   SpO2 100%   Breastfeeding No   BMI 23.87 kg/m      Physical Exam   Constitutional: Pleasant, well-appearing, no apparent distress, and appears stated age.  Eyes: Pupils equal, round and reactive to light, extra ocular muscles intact, sclera clear, conjunctiva normal.  HENT: Normocephalic and atraumatic, oral pharynx with moist mucus membranes, good dentition. No goiter appreciated.   Respiratory: Clear to auscultation bilaterally, no crackles or wheezing.  Cardiovascular: Regular rate and rhythm, normal S1 and S2, and no murmur noted.  Carotids +2, no bruits. No edema. Palpable pulses to radial  DP and PT arteries.   GI: Normal bowel sounds, soft, non-distended, non-tender, no masses palpated, no hepatosplenomegaly.    Lymph/Hematologic: No cervical lymphadenopathy and no supraclavicular lymphadenopathy.  Genitourinary: Deferred  Skin: Warm and dry. "  No rashes on exposed skin  Musculoskeletal: Full ROM of neck. There is no redness, warmth, or swelling of visible joints. Gross motor strength is normal.    Neurologic: Awake, alert, oriented to name, place and time. Cranial nerves II-XII are grossly intact. Gait is normal.   Neuropsychiatric: Calm, cooperative. Normal affect.     Prior Labs/Diagnostic Studies   All labs and imaging personally reviewed     COMP METABOLIC PANEL 1/19/2023  Specimen:  Blood   Ref Range & Units 4 mo ago Resulting Agency   Hr Post-Prandial HR Patient not Fasting  [facility]   Appearance:  Clear  [facility], [facility]   Calcium 8.6 - 10.3 mg/dL 10.1  [facility], [facility]   Glucose 70 - 99 mg/dL 81  [facility]   BUN 8 - 24 mg/dL 15  [facility]   Creatinine 0.40 - 1.10 mg/dL 0.75  [facility], [facility]   Total Protein 5.9 - 8.4 g/dL 8.4  [facility], [facility]   Albumin 3.5 - 5.2 g/dL 4.9  [facility], [facility]   Bilirubin, Total 0 - 1.2 mg/dL 0.6  [facility]   Alk Phos 40 - 118 U/L 62  [facility]   AST (SGOT) 13 - 38 U/L 19  [facility]   ALT (SGPT) 9 - 48 U/L 11  [facility], [facility]   A/G Ratio 1.3 - 2.7 1.4  [facility], [facility]   Sodium 133 - 145 mmol/L 139  [facility]   Potassium 3.3 - 5.1 mmol/L 4.8  [facility]   Chloride 96 - 108 mmol/L 102  [facility]   CO2 21 - 31 mmol/L 27  [facility]   Anion Gap 9 - 18 10  [facility]   eGFR CKD-EPI Cr 2021 >89 mL/min/1.73m2 110  [facility]   Specimen Collected: 01/19/23  6:33 PM     CBC PLT W/AUTO DIFF 1/19/2023     Ref Range & Units 4 mo ago   WBC 3.8 - 11.2 10(9)/L 5.5    RBC 3.9 - 5.2 10(12)/L 4.85    Hemoglobin 11.6 - 15.1 g/dL 14.6    Hematocrit 34.1 - 44.2 % 44.6 High     MCV 80 - 100 fL 92.0    MCH 27 - 33 pg 30.1    MCHC 30 - 35 g/dL 32.7    RDW 11 - 15 % 11.9    Platelet Count 150 - 450 10(9)/L 330    Diff Method  Auto    Neutrophils 40 - 70 % 53.2    Lymphs 20 - 45 % 27.1    Monocytes 4 - 10 % 13.3 High     Eosinophils 0 - 6 %  5.3    Basophils 0 - 2 % 0.9    Immature Gran (IG) 0.0 - 0.7 % 0.2    Neutrophils, Absolute 1.4 - 7.0 10(9)/L 2.91    Lymphs, Absolute 0.7 - 4.5 10(9)/L 1.48    Monocytes, Absolute 0.1 - 1.0 10(9)/L 0.73    Eosinophils, Absolute 0 - 0.6 10(9)/L 0.29    Basophils, Absolute 0 - 0.2 10(9)/L 0.05    Immature Gran,Absolute 0.00 - 0.06 10(9)/L 0.01        EKG/ stress test - if available please see in ROS above   No results found.    The patient's records and results personally reviewed by this provider.     Outside records reviewed from: Care Everywhere    LAB/DIAGNOSTIC STUDIES TODAY:  None    Assessment  Jed Brewer is a 27 year old female seen as a PAC referral for risk assessment and optimization for anesthesia.    Plan/Recommendations  Pt will be optimized for the proposed procedure.  See below for details on the assessment, risk, and preoperative recommendations    NEUROLOGY  - No history of TIA, CVA or seizure  - Migraine headaches, uses PRN OTC Excedrin, patient counseled on abstaining from this medication for 1 week prior to surgery. Okay to use tylenol if headache develops.  -Post Op delirium risk factors:  No risk identified    ENT  - No current airway concerns.  Will need to be reassessed day of surgery.  Mallampati: I  TM: > 3    CARDIAC  - No history of CAD, Hypertension and Afib  - METS (Metabolic Equivalents)  Patient performs 4 or more METS exercise without symptoms            Total Score: 0      RCRI-Very low risk: Class 1 0.4% complication rate            Total Score: 0        PULMONARY    PAULA Low Risk            Total Score: 0      - Denies asthma or inhaler use  - Tobacco History    History   Smoking Status     Never   Smokeless Tobacco     Never       GI    PONV High Risk  Total Score: 3           1 AN PONV: Pt is Female    1 AN PONV: Patient is not a current smoker    1 AN PONV: Intended Post Op Opioids        /RENAL  - Baseline Creatinine  0.75    ENDOCRINE    - BMI: Estimated body mass index is  "23.87 kg/m  as calculated from the following:    Height as of this encounter: 1.621 m (5' 3.8\").    Weight as of this encounter: 62.7 kg (138 lb 3.2 oz).  Healthy Weight (BMI 18.5-24.9)  - No history of Diabetes Mellitus    HEME  VTE Low Risk 0.26%            Total Score: 0      - No history of abnormal bleeding or antiplatelet use.      MSK  Patient is NOT Frail            Total Score: 0      - History of neck pain/stiff neck, working with PT. Recommendation for consideration of careful positioning to limit patent discomfort.    PSYCH  - Anxiety, depression, ADHD: reports feeling increased anxiety about moving and work, otherwise doing okay. States that she needs to establish care with a PCP as she recently moved to the [facility], referral order placed.    SKIN  - Hyperhidrosis: surgery as scheduled above  - Eczema: triggered by creams, lotions, etc. Discussed possibility of reaction to prep, recommended showering per surgeon recommendations after surgery and having oral antihistamine available at home.     Different anesthesia methods/types have been discussed with the patient, but they are aware that the final plan will be decided by the assigned anesthesia provider on the date of service.    The patient is optimized for their procedure. AVS with information on surgery time/arrival time, meds and NPO status given by nursing staff. No further diagnostic testing indicated.      On the day of service:     Prep time: 10 minutes  Visit time: 15 minutes  Documentation time: 10 minutes  ------------------------------------------  Total time: 35 minutes       Claire Vaughan PA-C  Preoperative Assessment Center  [facility]  [facility]  Phone: [phone]  Fax: [phone]  "

## 2023-06-19 ENCOUNTER — ANESTHESIA (OUTPATIENT)
Dept: SURGERY | Facility: CLINIC | Age: 28
End: 2023-06-19
Payer: COMMERCIAL

## 2023-06-19 ENCOUNTER — HOSPITAL ENCOUNTER (OUTPATIENT)
Facility: CLINIC | Age: 28
Discharge: HOME | End: 2023-06-19
Attending: STUDENT IN AN ORGANIZED HEALTH CARE EDUCATION/TRAINING PROGRAM | Admitting: STUDENT IN AN ORGANIZED HEALTH CARE EDUCATION/TRAINING PROGRAM
Payer: COMMERCIAL

## 2023-06-19 ENCOUNTER — APPOINTMENT (OUTPATIENT)
Dept: GENERAL RADIOLOGY | Facility: CLINIC | Age: 28
End: 2023-06-19
Attending: STUDENT IN AN ORGANIZED HEALTH CARE EDUCATION/TRAINING PROGRAM
Payer: COMMERCIAL

## 2023-06-19 VITALS
RESPIRATION RATE: 18 BRPM | HEART RATE: 74 BPM | OXYGEN SATURATION: 95 % | DIASTOLIC BLOOD PRESSURE: 70 MMHG | HEIGHT: 64 IN | SYSTOLIC BLOOD PRESSURE: 95 MMHG | BODY MASS INDEX: 23.26 KG/M2 | WEIGHT: 136.24 LBS | TEMPERATURE: 98.7 F

## 2023-06-19 DIAGNOSIS — Z98.890: Primary | ICD-10-CM

## 2023-06-19 LAB — RADIOLOGIST FLAGS: ABNORMAL

## 2023-06-19 PROCEDURE — 250N000009 HC RX 250: Performed by: ANESTHESIOLOGY

## 2023-06-19 PROCEDURE — 32664 THORACOSCOPY W/ TH NRV EXC: CPT | Mod: 50 | Performed by: STUDENT IN AN ORGANIZED HEALTH CARE EDUCATION/TRAINING PROGRAM

## 2023-06-19 PROCEDURE — 710N000010 HC RECOVERY PHASE 1, LEVEL 2, PER MIN: Performed by: STUDENT IN AN ORGANIZED HEALTH CARE EDUCATION/TRAINING PROGRAM

## 2023-06-19 PROCEDURE — 999N000141 HC STATISTIC PRE-PROCEDURE NURSING ASSESSMENT: Performed by: STUDENT IN AN ORGANIZED HEALTH CARE EDUCATION/TRAINING PROGRAM

## 2023-06-19 PROCEDURE — 250N000025 HC SEVOFLURANE, PER MIN: Performed by: STUDENT IN AN ORGANIZED HEALTH CARE EDUCATION/TRAINING PROGRAM

## 2023-06-19 PROCEDURE — 250N000009 HC RX 250: Performed by: STUDENT IN AN ORGANIZED HEALTH CARE EDUCATION/TRAINING PROGRAM

## 2023-06-19 PROCEDURE — 360N000077 HC SURGERY LEVEL 4, PER MIN: Performed by: STUDENT IN AN ORGANIZED HEALTH CARE EDUCATION/TRAINING PROGRAM

## 2023-06-19 PROCEDURE — 258N000003 HC RX IP 258 OP 636: Performed by: STUDENT IN AN ORGANIZED HEALTH CARE EDUCATION/TRAINING PROGRAM

## 2023-06-19 PROCEDURE — 71045 X-RAY EXAM CHEST 1 VIEW: CPT | Mod: 26 | Performed by: RADIOLOGY

## 2023-06-19 PROCEDURE — 250N000011 HC RX IP 250 OP 636: Performed by: STUDENT IN AN ORGANIZED HEALTH CARE EDUCATION/TRAINING PROGRAM

## 2023-06-19 PROCEDURE — 370N000017 HC ANESTHESIA TECHNICAL FEE, PER MIN: Performed by: STUDENT IN AN ORGANIZED HEALTH CARE EDUCATION/TRAINING PROGRAM

## 2023-06-19 PROCEDURE — 272N000001 HC OR GENERAL SUPPLY STERILE: Performed by: STUDENT IN AN ORGANIZED HEALTH CARE EDUCATION/TRAINING PROGRAM

## 2023-06-19 PROCEDURE — 710N000012 HC RECOVERY PHASE 2, PER MINUTE: Performed by: STUDENT IN AN ORGANIZED HEALTH CARE EDUCATION/TRAINING PROGRAM

## 2023-06-19 PROCEDURE — 999N000065 XR CHEST PORT 1 VIEW

## 2023-06-19 PROCEDURE — 250N000013 HC RX MED GY IP 250 OP 250 PS 637: Performed by: STUDENT IN AN ORGANIZED HEALTH CARE EDUCATION/TRAINING PROGRAM

## 2023-06-19 PROCEDURE — 250N000012 HC RX MED GY IP 250 OP 636 PS 637: Performed by: ANESTHESIOLOGY

## 2023-06-19 RX ORDER — SCOLOPAMINE TRANSDERMAL SYSTEM 1 MG/1
1 PATCH, EXTENDED RELEASE TRANSDERMAL ONCE
Status: COMPLETED | OUTPATIENT
Start: 2023-06-19 | End: 2023-06-20

## 2023-06-19 RX ORDER — AMOXICILLIN 250 MG
1-2 CAPSULE ORAL 2 TIMES DAILY
Qty: 30 TABLET | Refills: 0 | Status: SHIPPED | OUTPATIENT
Start: 2023-06-19 | End: 2023-08-08

## 2023-06-19 RX ORDER — HYDROMORPHONE HCL IN WATER/PF 6 MG/30 ML
0.4 PATIENT CONTROLLED ANALGESIA SYRINGE INTRAVENOUS EVERY 5 MIN PRN
Status: DISCONTINUED | OUTPATIENT
Start: 2023-06-19 | End: 2023-06-19 | Stop reason: HOSPADM

## 2023-06-19 RX ORDER — SODIUM CHLORIDE, SODIUM LACTATE, POTASSIUM CHLORIDE, CALCIUM CHLORIDE 600; 310; 30; 20 MG/100ML; MG/100ML; MG/100ML; MG/100ML
INJECTION, SOLUTION INTRAVENOUS CONTINUOUS
Status: DISCONTINUED | OUTPATIENT
Start: 2023-06-19 | End: 2023-06-19 | Stop reason: HOSPADM

## 2023-06-19 RX ORDER — FENTANYL CITRATE 50 UG/ML
25 INJECTION, SOLUTION INTRAMUSCULAR; INTRAVENOUS EVERY 5 MIN PRN
Status: DISCONTINUED | OUTPATIENT
Start: 2023-06-19 | End: 2023-06-19 | Stop reason: HOSPADM

## 2023-06-19 RX ORDER — CEFAZOLIN SODIUM/WATER 2 G/20 ML
2 SYRINGE (ML) INTRAVENOUS
Status: COMPLETED | OUTPATIENT
Start: 2023-06-19 | End: 2023-06-19

## 2023-06-19 RX ORDER — FENTANYL CITRATE 50 UG/ML
INJECTION, SOLUTION INTRAMUSCULAR; INTRAVENOUS PRN
Status: DISCONTINUED | OUTPATIENT
Start: 2023-06-19 | End: 2023-06-19

## 2023-06-19 RX ORDER — PROPOFOL 10 MG/ML
INJECTION, EMULSION INTRAVENOUS PRN
Status: DISCONTINUED | OUTPATIENT
Start: 2023-06-19 | End: 2023-06-19

## 2023-06-19 RX ORDER — CEFAZOLIN SODIUM/WATER 2 G/20 ML
2 SYRINGE (ML) INTRAVENOUS SEE ADMIN INSTRUCTIONS
Status: DISCONTINUED | OUTPATIENT
Start: 2023-06-19 | End: 2023-06-19 | Stop reason: HOSPADM

## 2023-06-19 RX ORDER — APREPITANT 40 MG/1
40 CAPSULE ORAL ONCE
Status: COMPLETED | OUTPATIENT
Start: 2023-06-19 | End: 2023-06-19

## 2023-06-19 RX ORDER — LIDOCAINE HYDROCHLORIDE 20 MG/ML
INJECTION, SOLUTION INFILTRATION; PERINEURAL PRN
Status: DISCONTINUED | OUTPATIENT
Start: 2023-06-19 | End: 2023-06-19

## 2023-06-19 RX ORDER — ACETAMINOPHEN 325 MG/1
650 TABLET ORAL
Status: DISCONTINUED | OUTPATIENT
Start: 2023-06-19 | End: 2023-06-26 | Stop reason: HOSPADM

## 2023-06-19 RX ORDER — ONDANSETRON 2 MG/ML
INJECTION INTRAMUSCULAR; INTRAVENOUS PRN
Status: DISCONTINUED | OUTPATIENT
Start: 2023-06-19 | End: 2023-06-19

## 2023-06-19 RX ORDER — ACETAMINOPHEN 325 MG/1
650 TABLET ORAL EVERY 4 HOURS PRN
Qty: 50 TABLET | Refills: 0 | Status: SHIPPED | OUTPATIENT
Start: 2023-06-19 | End: 2023-08-08

## 2023-06-19 RX ORDER — HYDROMORPHONE HCL IN WATER/PF 6 MG/30 ML
0.2 PATIENT CONTROLLED ANALGESIA SYRINGE INTRAVENOUS EVERY 5 MIN PRN
Status: DISCONTINUED | OUTPATIENT
Start: 2023-06-19 | End: 2023-06-19 | Stop reason: HOSPADM

## 2023-06-19 RX ORDER — LABETALOL HYDROCHLORIDE 5 MG/ML
10 INJECTION, SOLUTION INTRAVENOUS
Status: DISCONTINUED | OUTPATIENT
Start: 2023-06-19 | End: 2023-06-19 | Stop reason: HOSPADM

## 2023-06-19 RX ORDER — ACETAMINOPHEN 500 MG
1000 TABLET ORAL EVERY 6 HOURS PRN
COMMUNITY

## 2023-06-19 RX ORDER — ONDANSETRON 4 MG/1
4 TABLET, ORALLY DISINTEGRATING ORAL EVERY 8 HOURS PRN
Qty: 4 TABLET | Refills: 0 | Status: SHIPPED | OUTPATIENT
Start: 2023-06-19 | End: 2023-08-08

## 2023-06-19 RX ORDER — ONDANSETRON 4 MG/1
4 TABLET, ORALLY DISINTEGRATING ORAL EVERY 30 MIN PRN
Status: DISCONTINUED | OUTPATIENT
Start: 2023-06-19 | End: 2023-06-19 | Stop reason: HOSPADM

## 2023-06-19 RX ORDER — ONDANSETRON 2 MG/ML
4 INJECTION INTRAMUSCULAR; INTRAVENOUS EVERY 30 MIN PRN
Status: DISCONTINUED | OUTPATIENT
Start: 2023-06-19 | End: 2023-06-19 | Stop reason: HOSPADM

## 2023-06-19 RX ORDER — SODIUM CHLORIDE, SODIUM LACTATE, POTASSIUM CHLORIDE, CALCIUM CHLORIDE 600; 310; 30; 20 MG/100ML; MG/100ML; MG/100ML; MG/100ML
INJECTION, SOLUTION INTRAVENOUS CONTINUOUS PRN
Status: DISCONTINUED | OUTPATIENT
Start: 2023-06-19 | End: 2023-06-19

## 2023-06-19 RX ORDER — DEXAMETHASONE SODIUM PHOSPHATE 4 MG/ML
INJECTION, SOLUTION INTRA-ARTICULAR; INTRALESIONAL; INTRAMUSCULAR; INTRAVENOUS; SOFT TISSUE PRN
Status: DISCONTINUED | OUTPATIENT
Start: 2023-06-19 | End: 2023-06-19

## 2023-06-19 RX ORDER — HYDRALAZINE HYDROCHLORIDE 20 MG/ML
2.5-5 INJECTION INTRAMUSCULAR; INTRAVENOUS EVERY 10 MIN PRN
Status: DISCONTINUED | OUTPATIENT
Start: 2023-06-19 | End: 2023-06-19 | Stop reason: HOSPADM

## 2023-06-19 RX ORDER — FENTANYL CITRATE 50 UG/ML
50 INJECTION, SOLUTION INTRAMUSCULAR; INTRAVENOUS EVERY 5 MIN PRN
Status: DISCONTINUED | OUTPATIENT
Start: 2023-06-19 | End: 2023-06-19 | Stop reason: HOSPADM

## 2023-06-19 RX ORDER — LIDOCAINE 40 MG/G
CREAM TOPICAL
Status: DISCONTINUED | OUTPATIENT
Start: 2023-06-19 | End: 2023-06-19 | Stop reason: HOSPADM

## 2023-06-19 RX ORDER — BUPIVACAINE HYDROCHLORIDE AND EPINEPHRINE 2.5; 5 MG/ML; UG/ML
INJECTION, SOLUTION INFILTRATION; PERINEURAL PRN
Status: DISCONTINUED | OUTPATIENT
Start: 2023-06-19 | End: 2023-06-19 | Stop reason: HOSPADM

## 2023-06-19 RX ADMIN — SCOPALAMINE 1 PATCH: 1 PATCH, EXTENDED RELEASE TRANSDERMAL at 10:00

## 2023-06-19 RX ADMIN — FENTANYL CITRATE 50 MCG: 50 INJECTION, SOLUTION INTRAMUSCULAR; INTRAVENOUS at 13:19

## 2023-06-19 RX ADMIN — APREPITANT 40 MG: 40 CAPSULE ORAL at 10:00

## 2023-06-19 RX ADMIN — SUGAMMADEX 200 MG: 100 INJECTION, SOLUTION INTRAVENOUS at 12:28

## 2023-06-19 RX ADMIN — LIDOCAINE HYDROCHLORIDE 100 MG: 20 INJECTION, SOLUTION INFILTRATION; PERINEURAL at 10:48

## 2023-06-19 RX ADMIN — HYDROMORPHONE HYDROCHLORIDE 0.2 MG: 0.2 INJECTION, SOLUTION INTRAMUSCULAR; INTRAVENOUS; SUBCUTANEOUS at 13:22

## 2023-06-19 RX ADMIN — ACETAMINOPHEN 650 MG: 325 TABLET, FILM COATED ORAL at 13:38

## 2023-06-19 RX ADMIN — HYDROMORPHONE HYDROCHLORIDE 0.5 MG: 1 INJECTION, SOLUTION INTRAMUSCULAR; INTRAVENOUS; SUBCUTANEOUS at 11:16

## 2023-06-19 RX ADMIN — FENTANYL CITRATE 50 MCG: 50 INJECTION, SOLUTION INTRAMUSCULAR; INTRAVENOUS at 13:00

## 2023-06-19 RX ADMIN — HYDROMORPHONE HYDROCHLORIDE 0.2 MG: 0.2 INJECTION, SOLUTION INTRAMUSCULAR; INTRAVENOUS; SUBCUTANEOUS at 13:39

## 2023-06-19 RX ADMIN — PROPOFOL 150 MG: 10 INJECTION, EMULSION INTRAVENOUS at 10:48

## 2023-06-19 RX ADMIN — Medication 10 MG: at 11:25

## 2023-06-19 RX ADMIN — PHENYLEPHRINE HYDROCHLORIDE 100 MCG: 10 INJECTION INTRAVENOUS at 11:56

## 2023-06-19 RX ADMIN — ONDANSETRON 4 MG: 2 INJECTION INTRAMUSCULAR; INTRAVENOUS at 12:22

## 2023-06-19 RX ADMIN — PHENYLEPHRINE HYDROCHLORIDE 150 MCG: 10 INJECTION INTRAVENOUS at 12:24

## 2023-06-19 RX ADMIN — PHENYLEPHRINE HYDROCHLORIDE 200 MCG: 10 INJECTION INTRAVENOUS at 11:58

## 2023-06-19 RX ADMIN — PHENYLEPHRINE HYDROCHLORIDE 100 MCG: 10 INJECTION INTRAVENOUS at 12:11

## 2023-06-19 RX ADMIN — FENTANYL CITRATE 100 MCG: 50 INJECTION, SOLUTION INTRAMUSCULAR; INTRAVENOUS at 10:48

## 2023-06-19 RX ADMIN — Medication 10 MG: at 11:48

## 2023-06-19 RX ADMIN — HYDROMORPHONE HYDROCHLORIDE 0.2 MG: 0.2 INJECTION, SOLUTION INTRAMUSCULAR; INTRAVENOUS; SUBCUTANEOUS at 13:27

## 2023-06-19 RX ADMIN — Medication 2 G: at 10:44

## 2023-06-19 RX ADMIN — MIDAZOLAM 2 MG: 1 INJECTION INTRAMUSCULAR; INTRAVENOUS at 10:39

## 2023-06-19 RX ADMIN — SODIUM CHLORIDE, POTASSIUM CHLORIDE, SODIUM LACTATE AND CALCIUM CHLORIDE: 600; 310; 30; 20 INJECTION, SOLUTION INTRAVENOUS at 10:35

## 2023-06-19 RX ADMIN — HYDROMORPHONE HYDROCHLORIDE 0.2 MG: 0.2 INJECTION, SOLUTION INTRAMUSCULAR; INTRAVENOUS; SUBCUTANEOUS at 13:34

## 2023-06-19 RX ADMIN — Medication 50 MG: at 10:48

## 2023-06-19 RX ADMIN — DEXAMETHASONE SODIUM PHOSPHATE 10 MG: 4 INJECTION, SOLUTION INTRA-ARTICULAR; INTRALESIONAL; INTRAMUSCULAR; INTRAVENOUS; SOFT TISSUE at 11:01

## 2023-06-19 ASSESSMENT — ACTIVITIES OF DAILY LIVING (ADL)
ADLS_ACUITY_SCORE: 35

## 2023-06-19 NOTE — ANESTHESIA PREPROCEDURE EVALUATION
Anesthesia Pre-Procedure Evaluation    Patient: Jed Brewer   MRN: [id number] : 1995        Procedure : Procedure(s):  BILATERAL SYMPATHECTOMY, THORACOSCOPIC          Past Medical History:   Diagnosis Date     ADHD (attention deficit hyperactivity disorder)      Anxiety      Depression      Eczema      Generalized hyperhidrosis      Migraine       Past Surgical History:   Procedure Laterality Date     DENTAL SURGERY      [place] teeth      Allergies   Allergen Reactions     Bacitracin-Polymyxin B Rash     Bee Venom Hives, Itching, Rash and Swelling     welts        Social History     Tobacco Use     Smoking status: Never     Passive exposure: Past     Smokeless tobacco: Never   Vaping Use     Vaping status: Not on file   Substance Use Topics     Alcohol use: Not Currently     Comment: socially       Wt Readings from Last 1 Encounters:   23 61.8 kg (136 lb 3.9 oz)        Anesthesia Evaluation            ROS/MED HX  ENT/Pulmonary:       Neurologic:     (+) migraines,     Cardiovascular:    (-) murmur and wheezes   METS/Exercise Tolerance:     Hematologic:       Musculoskeletal:       GI/Hepatic:       Renal/Genitourinary:       Endo:       Psychiatric/Substance Use:     (+) psychiatric history anxiety and depression     Infectious Disease:       Malignancy:       Other:            Physical Exam    Airway        Mallampati: II   TM distance: > 3 FB   Neck ROM: full   Mouth opening: > 3 cm    Respiratory Devices and Support         Dental           Cardiovascular          Rhythm and rate: regular and normal (-) no systolic click and no murmur    Pulmonary   pulmonary exam normal        breath sounds clear to auscultation   (-) no wheezes        OUTSIDE LABS:  CBC:   Lab Results   Component Value Date    WBC 4.9 11/15/2016    HGB 12.9 11/15/2016    HCT 39.1 11/15/2016     11/15/2016     BMP: No results found for: NA, POTASSIUM, CHLORIDE, CO2, BUN, CR, GLC  COAGS: No results found for: PTT, INR,  FIBR  POC: No results found for: BGM, HCG, HCGS  HEPATIC: No results found for: ALBUMIN, PROTTOTAL, ALT, AST, GGT, ALKPHOS, BILITOTAL, BILIDIRECT, BENTLEY  OTHER:   Lab Results   Component Value Date    TSH 1.05 11/15/2016       Anesthesia Plan    ASA Status:  1   NPO Status:  NPO Appropriate    Anesthesia Type: General.     - Airway: ETT   Induction: Intravenous.   Maintenance: Inhalation.        Consents    Anesthesia Plan(s) and associated risks, benefits, and realistic alternatives discussed. Questions answered and patient/representative(s) expressed understanding.    - Discussed:     - Discussed with:  Patient      - Extended Intubation/Ventilatory Support Discussed: Yes.      - Patient is DNR/DNI Status: No    Use of blood products discussed: Yes.     - Discussed with: Patient.     Postoperative Care    Pain management: IV analgesics.   PONV prophylaxis: Ondansetron (or other 5HT-3), Dexamethasone or Solumedrol     Comments:                Christopher J. Behrens, MD

## 2023-06-19 NOTE — DISCHARGE INSTRUCTIONS
[facility], [place]  Same-Day Surgery   Adult Discharge Orders & Instructions     For 24 hours after surgery    Get plenty of rest.  A responsible adult must stay with you for at least 24 hours after you leave the hospital.   Do not drive or use heavy equipment.  If you have weakness or tingling, don't drive or use heavy equipment until this feeling goes away.  Do not drink alcohol.  Avoid strenuous or risky activities.  Ask for help when climbing stairs.   You may feel lightheaded.  IF so, sit for a few minutes before standing.  Have someone help you get up.   If you have nausea (feel sick to your stomach): Drink only clear liquids such as apple juice, ginger ale, broth or 7-Up.  Rest may also help.  Be sure to drink enough fluids.  Move to a regular diet as you feel able.  You may have a slight fever. Call the doctor if your fever is over 100 F (37.7 C) (taken under the tongue) or lasts longer than 24 hours.  You may have a dry mouth, a sore throat, muscle aches or trouble sleeping.  These should go away after 24 hours.  Do not make important or legal decisions.   Call your doctor for any of the followin.  Signs of infection (fever, growing tenderness at the surgery site, a large amount of drainage or bleeding, severe pain, foul-smelling drainage, redness, swelling).    2. It has been over 8 to 10 hours since surgery and you are still not able to urinate (pass water).    3.  Headache for over 24 hours.    To contact a doctor, call. Dr. Mary Beth Castaneda @ [phone] (clinic) or [phone] (office)  ________________________________________ or:    '   [phone] and ask for the resident on call for   ______________________________________________ (answered 24 hours a day)  '   Emergency Department:    [facility]: [phone]       (TTY for hearing impaired: [phone])

## 2023-06-19 NOTE — BRIEF OP NOTE
[facility]    Brief Operative Note    Pre-operative diagnosis: Hyperhidrosis [R61]  Post-operative diagnosis Same as pre-operative diagnosis    Procedure: Procedure(s):  BILATERAL SYMPATHECTOMY, THORACOSCOPIC  Surgeon: Surgeon(s) and Role:     * Mary Beth Castaneda MD - Primary     * Chas Booth MD - Resident - Assisting  Anesthesia: General   Estimated Blood Loss: Minimal    Drains: None  Specimens: * No specimens in log *  Findings:   None.  Complications: None.  Implants: * No implants in log *    Post-op CXR in PACU  Follow-up with Beth in Thoracic Surgery in 4 weeks

## 2023-06-19 NOTE — ANESTHESIA CARE TRANSFER NOTE
Patient: Jed Brewer    Procedure: Procedure(s):  BILATERAL SYMPATHECTOMY, THORACOSCOPIC       Diagnosis: Hyperhidrosis [R61]  Diagnosis Additional Information: No value filed.    Anesthesia Type:   No value filed.     Note:    Oropharynx: oropharynx clear of all foreign objects  Level of Consciousness: awake  Oxygen Supplementation: face mask  Level of Supplemental Oxygen (L/min / FiO2): 8  Independent Airway: airway patency satisfactory and stable  Dentition: dentition unchanged  Vital Signs Stable: post-procedure vital signs reviewed and stable  Report to RN Given: handoff report given  Patient transferred to: PACU    Handoff Report: Identifed the Patient, Identified the Reponsible Provider, Reviewed the pertinent medical history, Discussed the surgical course, Reviewed Intra-OP anesthesia mangement and issues during anesthesia, Set expectations for post-procedure period and Allowed opportunity for questions and acknowledgement of understanding      Vitals:  Vitals Value Taken Time   BP 92/58 06/19/23 1244   Temp     Pulse 74 06/19/23 1244   Resp     SpO2 99 % 06/19/23 1246   Vitals shown include unvalidated device data.    Electronically Signed By: Augusto Ugalde MD  June 19, 2023  12:48 PM

## 2023-06-19 NOTE — ANESTHESIA POSTPROCEDURE EVALUATION
Patient: Jed Brewer    Procedure: Procedure(s):  BILATERAL SYMPATHECTOMY, THORACOSCOPIC       Anesthesia Type:  No value filed.    Note:  Disposition: Outpatient   Postop Pain Control: Uneventful            Sign Out: Well controlled pain   PONV: No   Neuro/Psych: Uneventful            Sign Out: Acceptable/Baseline neuro status   Airway/Respiratory: Uneventful            Sign Out: Acceptable/Baseline resp. status   CV/Hemodynamics: Uneventful            Sign Out: Acceptable CV status   Other NRE: NONE   DID A NON-ROUTINE EVENT OCCUR? No           Last vitals:  Vitals Value Taken Time   /68 06/19/23 1315   Temp 36.7  C (98  F) 06/19/23 1245   Pulse 85 06/19/23 1319   Resp 6 06/19/23 1319   SpO2 95 % 06/19/23 1319   Vitals shown include unvalidated device data.    Electronically Signed By: Christopher J. Behrens, MD  June 19, 2023  1:21 PM

## 2023-06-19 NOTE — OP NOTE
Procedure Date: 06/19/23     PREOPERATIVE DIAGNOSES:  Bilateral palmar and  axillary hyperhidrosis.      POSTOPERATIVE DIAGNOSES:  Bilateral palmar and  axillary hyperhidrosis.      PROCEDURES PERFORMED:  Bilateral thoracoscopic clipping of sympathetic chain at T4 and T5.       SURGEON:  Mary Beth Castaneda MD         OPERATIVE FINDINGS:  Sympathetic chain identified bilaterally.       ESTIMATED BLOOD LOSS:  5 mL.           DESCRIPTION OF PROCEDURE:   After the patient had been consented and had been brought to the operating room and laid supine on the operating table, general anesthesia was induced.  A single lumen endotracheal tube was used.  The patient was laid supine with arms out.  The chest was prepped and draped bilaterally.  We started with the right side, making two 5 mm ports, 1 in the inframammary crease and 1 in the infraaxillary line.  An additional 5 mm port was used on the right side for lung retraction.  The sympathetic chain was skeletonized above the fourth rib and the fifth rib and clips were applied.  We also made sure to disconnect all the lateral branches.  After this, the lung was reinflated and the port sites were closed.  We then proceeded to perform a similar procedure on the left side with 2 ports, 1 in the inframammary and 1 in the axillary line, and after the sympathetic chain had been skeletonized and the lateral branch was cauterized, we clipped the sympathetic chain about the fourth rib and the fifth rib.  The lung was inflated.  The port sites were then closed in layers.  The patient tolerated the procedure well.

## 2023-06-20 ASSESSMENT — ACTIVITIES OF DAILY LIVING (ADL)
ADLS_ACUITY_SCORE: 35

## 2023-06-21 ASSESSMENT — ACTIVITIES OF DAILY LIVING (ADL)
ADLS_ACUITY_SCORE: 35

## 2023-06-22 ASSESSMENT — ACTIVITIES OF DAILY LIVING (ADL)
ADLS_ACUITY_SCORE: 35

## 2023-06-23 ASSESSMENT — ACTIVITIES OF DAILY LIVING (ADL)
ADLS_ACUITY_SCORE: 35

## 2023-06-24 ASSESSMENT — ACTIVITIES OF DAILY LIVING (ADL)
ADLS_ACUITY_SCORE: 35

## 2023-06-25 ASSESSMENT — ACTIVITIES OF DAILY LIVING (ADL)
ADLS_ACUITY_SCORE: 35

## 2023-07-07 DIAGNOSIS — L29.9 ITCHING: Primary | ICD-10-CM

## 2023-07-07 RX ORDER — HYDROXYZINE HYDROCHLORIDE 25 MG/1
25 TABLET, FILM COATED ORAL 3 TIMES DAILY PRN
Qty: 30 TABLET | Refills: 0 | Status: SHIPPED | OUTPATIENT
Start: 2023-07-07

## 2023-07-18 ENCOUNTER — ANCILLARY PROCEDURE (OUTPATIENT)
Dept: GENERAL RADIOLOGY | Facility: CLINIC | Age: 28
End: 2023-07-18
Attending: STUDENT IN AN ORGANIZED HEALTH CARE EDUCATION/TRAINING PROGRAM
Payer: COMMERCIAL

## 2023-07-18 DIAGNOSIS — R61 HYPERHIDROSIS: ICD-10-CM

## 2023-07-18 PROCEDURE — 71046 X-RAY EXAM CHEST 2 VIEWS: CPT

## 2023-07-18 NOTE — PROGRESS NOTES
THORACIC SURGERY FOLLOW UP VISIT      I saw Mrs. Jed Brewer in follow-up today. The clinical summary follows:     PREOP DIAGNOSIS   Bilateral palmar and axillary hyperhidrosis  PROCEDURE   Bilateral thoracoscopic sympathectomy at T4 and T5  DATE OF PROCEDURE  06/16/2023    COMPLICATIONS  None    INTERVAL STUDIES  CXR: no pneumothorax. Surgical clips from sympathectomy    Past Medical History:   Diagnosis Date     ADHD (attention deficit hyperactivity disorder)      Anxiety      Depression      Eczema      Generalized hyperhidrosis      Migraine       Past Surgical History:   Procedure Laterality Date     DENTAL SURGERY      [place] teeth     THORACOSCOPIC SYMPATHECTOMY Bilateral 6/19/2023    Procedure: BILATERAL SYMPATHECTOMY, THORACOSCOPIC;  Surgeon: Mary Beth Castaneda MD;  Location:  OR     Social History     Socioeconomic History     Marital status:      Spouse name: Not on file     Number of children: Not on file     Years of education: Not on file     Highest education level: Not on file   Occupational History     Not on file   Tobacco Use     Smoking status: Never     Passive exposure: Past     Smokeless tobacco: Never   Substance and Sexual Activity     Alcohol use: Not Currently     Comment: socially      Drug use: No     Sexual activity: Not Currently   Other Topics Concern     Parent/sibling w/ CABG, MI or angioplasty before 65F 55M? Not Asked   Social History Narrative     Not on file     Social Determinants of Health     Financial Resource Strain: Not on file   Food Insecurity: Not on file   Transportation Needs: Not on file   Physical Activity: Not on file   Stress: Not on file   Social Connections: Not on file   Intimate Partner Violence: Not on file   Housing Stability: Not on file       SUBJECTIVE  Jed is doing well. She still has some numbness in the right axilla. Her other neuropathic pain (sharp shooting pain) has resolved. She thinks she may be allergic to surgical glue because she  developed a rash around each incision. She has not needed to use the Atarax for about a week. She has some eczema cream that she has been applying which helps when she gets itchy. Her hands are dry but her feet are still quite sweaty. She does have compensatory sweating but feels this is much more manageable compared to her primary hyperhidrosis. She is very happy she had the surgery. She is considering Botox to her feet for the summers so she can wear sandals.    OBJECTIVE  /64 (BP Location: Right arm, Patient Position: Chair, Cuff Size: Adult Regular)   Pulse 78   Temp 98  F (36.7  C)   Resp 16   Wt 62.9 kg (138 lb 11.2 oz)   LMP 06/01/2023 (Exact Date)   SpO2 98%   BMI 23.81 kg/m       Her incisions are healed nicely. There are still some patches of skin irritation but per Jed, these have vastly improved.     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    IMPRESSION   27 year-old female with bilateral palmar and axillary hyperhidrosis status post bilateral thoracoscopic sympathectomy at T4 and T5. She is here for post operative follow up.    PLAN  I spent 15 min on the date of the encounter in chart review, patient visit, review of tests, documentation and/or discussion with other providers about the issues documented above. I reviewed the plan as follows:  Follow up with Thoracic Surgery as needed  Avoid sun exposure of the incisions this summer as the sun can cause permanent darkening of the incisions.  All questions were answered and the patient and present family were in agreement with the plan.  I appreciate the opportunity to participate in the care of your patient and will keep you updated.  Sincerely,

## 2023-07-19 ENCOUNTER — ONCOLOGY VISIT (OUTPATIENT)
Dept: SURGERY | Facility: CLINIC | Age: 28
End: 2023-07-19
Attending: STUDENT IN AN ORGANIZED HEALTH CARE EDUCATION/TRAINING PROGRAM
Payer: COMMERCIAL

## 2023-07-19 VITALS
TEMPERATURE: 98 F | BODY MASS INDEX: 23.81 KG/M2 | RESPIRATION RATE: 16 BRPM | OXYGEN SATURATION: 98 % | DIASTOLIC BLOOD PRESSURE: 64 MMHG | WEIGHT: 138.7 LBS | SYSTOLIC BLOOD PRESSURE: 103 MMHG | HEART RATE: 78 BPM

## 2023-07-19 DIAGNOSIS — R61 HYPERHIDROSIS: Primary | ICD-10-CM

## 2023-07-19 PROCEDURE — 99024 POSTOP FOLLOW-UP VISIT: CPT | Performed by: CLINICAL NURSE SPECIALIST

## 2023-07-19 PROCEDURE — G0463 HOSPITAL OUTPT CLINIC VISIT: HCPCS | Performed by: CLINICAL NURSE SPECIALIST

## 2023-07-19 ASSESSMENT — PAIN SCALES - GENERAL: PAINLEVEL: NO PAIN (0)

## 2023-07-19 NOTE — LETTER
7/19/2023         RE: Jed Brewer  [address]  [address]  [facility] [address]        Dear Colleague,    Thank you for referring your patient, Jed Brewer, to the [facility]. Please see a copy of my visit note below.    THORACIC SURGERY FOLLOW UP VISIT      I saw Mrs. Jed Brewer in follow-up today. The clinical summary follows:     PREOP DIAGNOSIS   Bilateral palmar and axillary hyperhidrosis  PROCEDURE   Bilateral thoracoscopic sympathectomy at T4 and T5  DATE OF PROCEDURE  06/16/2023    COMPLICATIONS  None    INTERVAL STUDIES  CXR: no pneumothorax. Surgical clips from sympathectomy    Past Medical History:   Diagnosis Date    ADHD (attention deficit hyperactivity disorder)     Anxiety     Depression     Eczema     Generalized hyperhidrosis     Migraine       Past Surgical History:   Procedure Laterality Date    DENTAL SURGERY      [place] teeth    THORACOSCOPIC SYMPATHECTOMY Bilateral 6/19/2023    Procedure: BILATERAL SYMPATHECTOMY, THORACOSCOPIC;  Surgeon: Mary Beth Castaneda MD;  Location:  OR     Social History     Socioeconomic History    Marital status:      Spouse name: Not on file    Number of children: Not on file    Years of education: Not on file    Highest education level: Not on file   Occupational History    Not on file   Tobacco Use    Smoking status: Never     Passive exposure: Past    Smokeless tobacco: Never   Substance and Sexual Activity    Alcohol use: Not Currently     Comment: socially     Drug use: No    Sexual activity: Not Currently   Other Topics Concern    Parent/sibling w/ CABG, MI or angioplasty before 65F 55M? Not Asked   Social History Narrative    Not on file     Social Determinants of Health     Financial Resource Strain: Not on file   Food Insecurity: Not on file   Transportation Needs: Not on file   Physical Activity: Not on file   Stress: Not on file   Social Connections: Not on file   Intimate Partner Violence: Not on file    Housing Stability: Not on file       SUBJECTIVE  Jed is doing well. She still has some numbness in the right axilla. Her other neuropathic pain (sharp shooting pain) has resolved. She thinks she may be allergic to surgical glue because she developed a rash around each incision. She has not needed to use the Atarax for about a week. She has some eczema cream that she has been applying which helps when she gets itchy. Her hands are dry but her feet are still quite sweaty. She does have compensatory sweating but feels this is much more manageable compared to her primary hyperhidrosis. She is very happy she had the surgery. She is considering Botox to her feet for the summers so she can wear sandals.    OBJECTIVE  /64 (BP Location: Right arm, Patient Position: Chair, Cuff Size: Adult Regular)   Pulse 78   Temp 98  F (36.7  C)   Resp 16   Wt 62.9 kg (138 lb 11.2 oz)   LMP 06/01/2023 (Exact Date)   SpO2 98%   BMI 23.81 kg/m       Her incisions are healed nicely. There are still some patches of skin irritation but per Jed, these have vastly improved.     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    IMPRESSION   27 year-old female with bilateral palmar and axillary hyperhidrosis status post bilateral thoracoscopic sympathectomy at T4 and T5. She is here for post operative follow up.    PLAN  I spent 15 min on the date of the encounter in chart review, patient visit, review of tests, documentation and/or discussion with other providers about the issues documented above. I reviewed the plan as follows:  Follow up with Thoracic Surgery as needed  Avoid sun exposure of the incisions this summer as the sun can cause permanent darkening of the incisions.  All questions were answered and the patient and present family were in agreement with the plan.  I appreciate the opportunity to participate in the care of your patient and will keep you  updated.  Sincerely,      Lynn Marshall, APRN CNS

## 2023-08-03 NOTE — PROGRESS NOTES
Jed is a 27 year old  female who presents for annual exam.     Besides routine health maintenance, she has no other health concerns today .    HPI:  The patient's PCP is  Physician No Ref-Primary.  New patient to me here today for her annual GYN exam.  She is also needing a refill of her Wellbutrin.  She and her  recently moved back to [place] from [facility].  She does need to reestablish with psychiatric for management of her Wellbutrin.  She needs a Pap smear today and is using condoms for contraception.      GYNECOLOGIC HISTORY:    Patient's last menstrual period was 2023 (exact date).    Regular menses? yes  Menses every 28-30 days.  Length of menses: 3 days    Her current contraception method is: condoms.  She  reports that she has never smoked. She has been exposed to tobacco smoke. She has never used smokeless tobacco.    Patient is sexually active.  STD testing offered?  Declined  Last PHQ-9 score on record =       2023     7:42 AM   PHQ-9 SCORE   PHQ-9 Total Score 9     Last GAD7 score on record =       2023     7:42 AM   YISEL-7 SCORE   Total Score 9     Alcohol Score = 0    HEALTH MAINTENANCE:    Pap:   Lab Results   Component Value Date    PAP NIL-neg hpv 2016     Health maintenance updated:  yes    Care Gaps     Overdue     Never  Done ADVANCE CARE PLANNING (Every 5 Years)   Never  Done HEPATITIS C SCREENING (Once)   NOV 3  2017 YEARLY PREVENTIVE VISIT (Yearly)  Last completed: Nov 3, 2016   JULIA 8  2023 PAP (Every 3 Years)   Last completed: 2020         Upcoming     SEP 1  2023 INFLUENZA VACCINE (1)  Last completed: 2022   NOV 3  2026 DTAP/TDAP/TD IMMUNIZATION (8 - Td or Tdap)   Last completed: Nov 3, 2016     HISTORY:  OB History    Para Term  AB Living   0 0 0 0 0 0   SAB IAB Ectopic Multiple Live Births   0 0 0 0 0       Patient Active Problem List   Diagnosis    Vegetarian diet     Past Surgical History:   Procedure Laterality Date    DENTAL  "SURGERY      [place] teeth    THORACOSCOPIC SYMPATHECTOMY Bilateral 6/19/2023    Procedure: BILATERAL SYMPATHECTOMY, THORACOSCOPIC;  Surgeon: Mary Beth Castaneda MD;  Location:  OR      Social History     Tobacco Use    Smoking status: Never     Passive exposure: Past    Smokeless tobacco: Never   Substance Use Topics    Alcohol use: Not Currently     Comment: socially       Problem (# of Occurrences) Relation (Name,Age of Onset)    Anesthesia Reaction (1) Father: Nausea, slow to wake    Diabetes (1) Mother           Negative family history of: Venous thrombosis, Bleeding Disorder              Current Outpatient Medications   Medication Sig    buPROPion (WELLBUTRIN XL) 150 MG 24 hr tablet Take 1 tablet (150 mg) by mouth every morning    acetaminophen (TYLENOL) 500 MG tablet Take 1,000 mg by mouth every 6 hours as needed for mild pain (Patient not taking: Reported on 8/8/2023)    hydrOXYzine (ATARAX) 10 MG tablet TAKE 1 TABLET BY MOUTH 3 TIMES A DAY AS NEEDED FOR ITCHING FOR UP TO 10 DAYS. (Patient not taking: Reported on 7/19/2023)    hydrOXYzine (ATARAX) 25 MG tablet Take 1 tablet (25 mg) by mouth 3 times daily as needed for itching (Patient not taking: Reported on 7/19/2023)     No current facility-administered medications for this visit.     Allergies   Allergen Reactions    Bacitracin-Polymyxin B Rash    Bee Venom Hives, Itching, Rash and Swelling     welts         Past medical, surgical, social and family histories were reviewed and updated in EPIC.    ROS:   12 point review of systems negative other than symptoms noted below or in the HPI.  No urinary frequency or dysuria, bladder or kidney problems, Normal menstrual cycles    EXAM:  /62   Ht 1.664 m (5' 5.51\")   Wt 62.9 kg (138 lb 9.6 oz)   LMP 08/07/2023 (Exact Date)   BMI 22.71 kg/m     BMI: Body mass index is 22.71 kg/m .    PHYSICAL EXAM:  Constitutional:   Appearance: Well nourished, well developed, alert, in no acute distress  Neck:  Lymph Nodes: "  No lymphadenopathy present    Thyroid:  Gland size normal, nontender, no nodules or masses present  on palpation  Chest:  Respiratory Effort:  Breathing unlabored  Cardiovascular:    Heart: Auscultation:  Regular rate, normal rhythm, no murmurs present  Breasts: Inspection of Breasts:  No lymphadenopathy present., Palpation of Breasts and Axillae:  No masses present on palpation, no breast tenderness., Axillary Lymph Nodes:  No lymphadenopathy present., and No nodularity, asymmetry or nipple discharge bilaterally.  Gastrointestinal:   Abdominal Examination:  Abdomen nontender to palpation, tone normal without rigidity or guarding, no masses present, umbilicus without lesions   Liver and Spleen:  No hepatomegaly present, liver nontender to palpation    Hernias:  No hernias present  Lymphatic: Lymph Nodes:  No other lymphadenopathy present  Skin:  General Inspection:  No rashes present, no lesions present, no areas of  discoloration  Neurologic:    Mental Status:  Oriented X3.  Normal strength and tone, sensory exam                grossly normal, mentation intact and speech normal.    Psychiatric:   Mentation appears normal and affect normal/bright.         Pelvic Exam:  External Genitalia:     Normal appearance for age, no discharge present, no tenderness present, no inflammatory lesions present, color normal  Vagina:     Normal vaginal vault without central or paravaginal defects, no discharge present, no inflammatory lesions present, no masses present  Bladder:     Nontender to palpation  Urethra:   Urethral Body:  Urethra palpation normal, urethra structural support normal   Urethral Meatus:  No erythema or lesions present  Cervix:     Appearance healthy, no lesions present, nontender to palpation, no bleeding present  Uterus:     Uterus: firm, normal sized and nontender, midplane in position.   Adnexa:     No adnexal tenderness present, no adnexal masses present  Perineum:     Perineum within normal limits, no  evidence of trauma, no rashes or skin lesions present  Anus:     Anus within normal limits, no hemorrhoids present  Inguinal Lymph Nodes:     No lymphadenopathy present  Pubic Hair:     Normal pubic hair distribution for age  Genitalia and Groin:     No rashes present, no lesions present, no areas of discoloration, no masses present    COUNSELING:   Special attention given to:        Regular exercise       Healthy diet/nutrition       Contraception    BMI: Body mass index is 22.71 kg/m .      ASSESSMENT:  27 year old female with satisfactory annual exam.    ICD-10-CM    1. Encntr for gyn exam (general) (routine) w/o abn findings  Z01.419       2. Encounter for screening for cervical cancer  Z12.4 Pap thin layer screen reflex to HPV if ASCUS - recommended age 25 - 29 years      3. Anxiety  F41.9 buPROPion (WELLBUTRIN XL) 150 MG 24 hr tablet     Adult Mental Health  Referral      4. Encounter for lipid screening for cardiovascular disease  Z13.220 Lipid Profile    Z13.6 Lipid Profile      5. Screening for metabolic disorder  Z13.228 Hemoglobin A1c     Comprehensive metabolic panel     Hemoglobin A1c     Comprehensive metabolic panel      6. Screening for thyroid disorder  Z13.29 TSH with free T4 reflex     TSH with free T4 reflex          PLAN:  27-year-old female with a normal GYN exam.  Pap smear was collected and if it is normal she can repeat in 3 years.  Fasting labs to be completed today.  Medication was refilled and mental health referral was sent to establish care.    WIL Farmer CNP

## 2023-08-08 ENCOUNTER — OFFICE VISIT (OUTPATIENT)
Dept: OBGYN | Facility: CLINIC | Age: 28
End: 2023-08-08
Payer: COMMERCIAL

## 2023-08-08 VITALS
WEIGHT: 138.6 LBS | SYSTOLIC BLOOD PRESSURE: 104 MMHG | BODY MASS INDEX: 22.28 KG/M2 | HEIGHT: 66 IN | DIASTOLIC BLOOD PRESSURE: 62 MMHG

## 2023-08-08 DIAGNOSIS — Z13.6 ENCOUNTER FOR LIPID SCREENING FOR CARDIOVASCULAR DISEASE: ICD-10-CM

## 2023-08-08 DIAGNOSIS — Z13.29 SCREENING FOR THYROID DISORDER: ICD-10-CM

## 2023-08-08 DIAGNOSIS — Z12.4 ENCOUNTER FOR SCREENING FOR CERVICAL CANCER: ICD-10-CM

## 2023-08-08 DIAGNOSIS — Z13.220 ENCOUNTER FOR LIPID SCREENING FOR CARDIOVASCULAR DISEASE: ICD-10-CM

## 2023-08-08 DIAGNOSIS — Z13.228 SCREENING FOR METABOLIC DISORDER: ICD-10-CM

## 2023-08-08 DIAGNOSIS — F41.9 ANXIETY: ICD-10-CM

## 2023-08-08 DIAGNOSIS — Z01.419 ENCNTR FOR GYN EXAM (GENERAL) (ROUTINE) W/O ABN FINDINGS: Primary | ICD-10-CM

## 2023-08-08 LAB
ALBUMIN SERPL BCG-MCNC: 4.3 G/DL (ref 3.5–5.2)
ALP SERPL-CCNC: 49 U/L (ref 35–104)
ALT SERPL W P-5'-P-CCNC: 11 U/L (ref 0–50)
ANION GAP SERPL CALCULATED.3IONS-SCNC: 10 MMOL/L (ref 7–15)
AST SERPL W P-5'-P-CCNC: 20 U/L (ref 0–45)
BILIRUB SERPL-MCNC: 0.4 MG/DL
BUN SERPL-MCNC: 12.8 MG/DL (ref 6–20)
CALCIUM SERPL-MCNC: 9.3 MG/DL (ref 8.6–10)
CHLORIDE SERPL-SCNC: 103 MMOL/L (ref 98–107)
CHOLEST SERPL-MCNC: 132 MG/DL
CREAT SERPL-MCNC: 0.75 MG/DL (ref 0.51–0.95)
DEPRECATED HCO3 PLAS-SCNC: 26 MMOL/L (ref 22–29)
GFR SERPL CREATININE-BSD FRML MDRD: >90 ML/MIN/1.73M2
GLUCOSE SERPL-MCNC: 70 MG/DL (ref 70–99)
HBA1C MFR BLD: 5.1 % (ref 0–5.6)
HDLC SERPL-MCNC: 65 MG/DL
LDLC SERPL CALC-MCNC: 56 MG/DL
NONHDLC SERPL-MCNC: 67 MG/DL
POTASSIUM SERPL-SCNC: 4 MMOL/L (ref 3.4–5.3)
PROT SERPL-MCNC: 7.5 G/DL (ref 6.4–8.3)
SODIUM SERPL-SCNC: 139 MMOL/L (ref 136–145)
TRIGL SERPL-MCNC: 54 MG/DL
TSH SERPL DL<=0.005 MIU/L-ACNC: 2.08 UIU/ML (ref 0.3–4.2)

## 2023-08-08 PROCEDURE — 84443 ASSAY THYROID STIM HORMONE: CPT | Performed by: NURSE PRACTITIONER

## 2023-08-08 PROCEDURE — 83036 HEMOGLOBIN GLYCOSYLATED A1C: CPT | Performed by: NURSE PRACTITIONER

## 2023-08-08 PROCEDURE — 99385 PREV VISIT NEW AGE 18-39: CPT | Performed by: NURSE PRACTITIONER

## 2023-08-08 PROCEDURE — 80061 LIPID PANEL: CPT | Performed by: NURSE PRACTITIONER

## 2023-08-08 PROCEDURE — 80053 COMPREHEN METABOLIC PANEL: CPT | Performed by: NURSE PRACTITIONER

## 2023-08-08 PROCEDURE — 36415 COLL VENOUS BLD VENIPUNCTURE: CPT | Performed by: NURSE PRACTITIONER

## 2023-08-08 PROCEDURE — G0145 SCR C/V CYTO,THINLAYER,RESCR: HCPCS | Performed by: NURSE PRACTITIONER

## 2023-08-08 RX ORDER — BUPROPION HYDROCHLORIDE 150 MG/1
150 TABLET ORAL EVERY MORNING
Qty: 90 TABLET | Refills: 1 | Status: SHIPPED | OUTPATIENT
Start: 2023-08-08

## 2023-08-08 ASSESSMENT — PATIENT HEALTH QUESTIONNAIRE - PHQ9
SUM OF ALL RESPONSES TO PHQ QUESTIONS 1-9: 9
5. POOR APPETITE OR OVEREATING: MORE THAN HALF THE DAYS

## 2023-08-08 ASSESSMENT — ANXIETY QUESTIONNAIRES
GAD7 TOTAL SCORE: 9
IF YOU CHECKED OFF ANY PROBLEMS ON THIS QUESTIONNAIRE, HOW DIFFICULT HAVE THESE PROBLEMS MADE IT FOR YOU TO DO YOUR WORK, TAKE CARE OF THINGS AT HOME, OR GET ALONG WITH OTHER PEOPLE: VERY DIFFICULT
3. WORRYING TOO MUCH ABOUT DIFFERENT THINGS: SEVERAL DAYS
GAD7 TOTAL SCORE: 9
6. BECOMING EASILY ANNOYED OR IRRITABLE: SEVERAL DAYS
7. FEELING AFRAID AS IF SOMETHING AWFUL MIGHT HAPPEN: NOT AT ALL
1. FEELING NERVOUS, ANXIOUS, OR ON EDGE: MORE THAN HALF THE DAYS
2. NOT BEING ABLE TO STOP OR CONTROL WORRYING: SEVERAL DAYS
5. BEING SO RESTLESS THAT IT IS HARD TO SIT STILL: MORE THAN HALF THE DAYS

## 2023-08-11 LAB
BKR LAB AP GYN ADEQUACY: NORMAL
BKR LAB AP GYN INTERPRETATION: NORMAL
BKR LAB AP HPV REFLEX: NORMAL
BKR LAB AP PREVIOUS ABNORMAL: NORMAL
PATH REPORT.COMMENTS IMP SPEC: NORMAL
PATH REPORT.COMMENTS IMP SPEC: NORMAL
PATH REPORT.RELEVANT HX SPEC: NORMAL

## 2024-11-10 ENCOUNTER — HEALTH MAINTENANCE LETTER (OUTPATIENT)
Age: 29
End: 2024-11-10

## 2024-12-17 ENCOUNTER — LAB REQUISITION (OUTPATIENT)
Dept: LAB | Facility: CLINIC | Age: 29
End: 2024-12-17
Payer: COMMERCIAL

## 2024-12-17 DIAGNOSIS — Z13.29 ENCOUNTER FOR SCREENING FOR OTHER SUSPECTED ENDOCRINE DISORDER: ICD-10-CM

## 2024-12-17 DIAGNOSIS — Z13.89 ENCOUNTER FOR SCREENING FOR OTHER DISORDER: ICD-10-CM

## 2024-12-17 LAB
ERYTHROCYTE [DISTWIDTH] IN BLOOD BY AUTOMATED COUNT: 11.9 % (ref 10–15)
HCT VFR BLD AUTO: 42.2 % (ref 35–47)
HGB BLD-MCNC: 14 G/DL (ref 11.7–15.7)
MCH RBC QN AUTO: 29.8 PG (ref 26.5–33)
MCHC RBC AUTO-ENTMCNC: 33.2 G/DL (ref 31.5–36.5)
MCV RBC AUTO: 90 FL (ref 78–100)
PLATELET # BLD AUTO: 340 10E3/UL (ref 150–450)
RBC # BLD AUTO: 4.7 10E6/UL (ref 3.8–5.2)
TSH SERPL DL<=0.005 MIU/L-ACNC: 3.59 UIU/ML (ref 0.3–4.2)
VIT D+METAB SERPL-MCNC: 21 NG/ML (ref 20–50)
WBC # BLD AUTO: 6.3 10E3/UL (ref 4–11)

## 2024-12-17 PROCEDURE — 85014 HEMATOCRIT: CPT | Performed by: MIDWIFE

## 2024-12-17 PROCEDURE — 84443 ASSAY THYROID STIM HORMONE: CPT | Performed by: MIDWIFE

## 2024-12-17 PROCEDURE — 82306 VITAMIN D 25 HYDROXY: CPT | Mod: ORL | Performed by: MIDWIFE

## 2024-12-17 PROCEDURE — 85041 AUTOMATED RBC COUNT: CPT | Performed by: MIDWIFE

## 2024-12-17 PROCEDURE — 82306 VITAMIN D 25 HYDROXY: CPT | Performed by: MIDWIFE

## 2025-05-20 ENCOUNTER — LAB REQUISITION (OUTPATIENT)
Dept: LAB | Facility: CLINIC | Age: 30
End: 2025-05-20
Payer: COMMERCIAL

## 2025-05-20 DIAGNOSIS — O36.80X0 PREGNANCY WITH INCONCLUSIVE FETAL VIABILITY, NOT APPLICABLE OR UNSPECIFIED: ICD-10-CM

## 2025-05-20 LAB — HCG INTACT+B SERPL-ACNC: 5600 MIU/ML

## 2025-05-20 PROCEDURE — 84702 CHORIONIC GONADOTROPIN TEST: CPT | Mod: ORL | Performed by: MIDWIFE

## (undated) DEVICE — NDL BLUNT FILL 18GA 1 1/2" 305064

## (undated) DEVICE — ESU PENCIL SMOKE EVAC W/ROCKER SWITCH 0703-047-000

## (undated) DEVICE — SU SILK 0 TIE 6X18" A186H

## (undated) DEVICE — TUBING SUCTION 10'X3/16" N510

## (undated) DEVICE — LINEN TOWEL PACK X6 WHITE 5487

## (undated) DEVICE — SUCTION MANIFOLD NEPTUNE 2 SYS 4 PORT 0702-020-000

## (undated) DEVICE — DRAPE IOBAN INCISE 23X17" 6650EZ

## (undated) DEVICE — ENDO TROCAR FIRST ENTRY KII FIOS Z-THRD 05X100MM CTF03

## (undated) DEVICE — LINEN TOWEL PACK X5 5464

## (undated) DEVICE — LINEN GOWN XLG 5407

## (undated) DEVICE — PREP CHLORAPREP 26ML TINTED HI-LITE ORANGE 930815

## (undated) DEVICE — SOL WATER IRRIG 1000ML BOTTLE 2F7114

## (undated) DEVICE — ENDO SCOPE WARMER SEAL  C3101

## (undated) DEVICE — SU MONOCRYL 4-0 PS-2 27" UND Y426H

## (undated) DEVICE — CLIP APPLIER ENDO 5MM M/L LIGAMAX EL5ML

## (undated) DEVICE — Device

## (undated) DEVICE — APPLICATOR COTTON TIP 3" 9325

## (undated) DEVICE — ESU ELEC BLADE 6" COATED/INSULATED E1455-6

## (undated) DEVICE — ESU GROUND PAD ADULT W/CORD E7507

## (undated) DEVICE — DRAPE U SPLIT 74X120" 29440

## (undated) DEVICE — SYR 30ML LL W/O NDL 302832

## (undated) DEVICE — TUBING SMOKE EVAC PNEUMOCLEAR HIGH FLOW 0620050250

## (undated) DEVICE — GLOVE BIOGEL PI MICRO SZ 6.0 48560

## (undated) DEVICE — SU DERMABOND ADVANCED .7ML DNX12

## (undated) RX ORDER — HYDROMORPHONE HCL IN WATER/PF 6 MG/30 ML
PATIENT CONTROLLED ANALGESIA SYRINGE INTRAVENOUS
Status: DISPENSED
Start: 2023-06-19

## (undated) RX ORDER — ONDANSETRON 2 MG/ML
INJECTION INTRAMUSCULAR; INTRAVENOUS
Status: DISPENSED
Start: 2023-06-19

## (undated) RX ORDER — FENTANYL CITRATE-0.9 % NACL/PF 10 MCG/ML
PLASTIC BAG, INJECTION (ML) INTRAVENOUS
Status: DISPENSED
Start: 2023-06-19

## (undated) RX ORDER — SCOLOPAMINE TRANSDERMAL SYSTEM 1 MG/1
PATCH, EXTENDED RELEASE TRANSDERMAL
Status: DISPENSED
Start: 2023-06-19

## (undated) RX ORDER — APREPITANT 40 MG/1
CAPSULE ORAL
Status: DISPENSED
Start: 2023-06-19

## (undated) RX ORDER — HYDROMORPHONE HYDROCHLORIDE 1 MG/ML
INJECTION, SOLUTION INTRAMUSCULAR; INTRAVENOUS; SUBCUTANEOUS
Status: DISPENSED
Start: 2023-06-19

## (undated) RX ORDER — PROPOFOL 10 MG/ML
INJECTION, EMULSION INTRAVENOUS
Status: DISPENSED
Start: 2023-06-19

## (undated) RX ORDER — BUPIVACAINE HYDROCHLORIDE AND EPINEPHRINE 2.5; 5 MG/ML; UG/ML
INJECTION, SOLUTION EPIDURAL; INFILTRATION; INTRACAUDAL; PERINEURAL
Status: DISPENSED
Start: 2023-06-19

## (undated) RX ORDER — ACETAMINOPHEN 325 MG/1
TABLET ORAL
Status: DISPENSED
Start: 2023-06-19

## (undated) RX ORDER — FENTANYL CITRATE 50 UG/ML
INJECTION, SOLUTION INTRAMUSCULAR; INTRAVENOUS
Status: DISPENSED
Start: 2023-06-19

## (undated) RX ORDER — CEFAZOLIN SODIUM/WATER 2 G/20 ML
SYRINGE (ML) INTRAVENOUS
Status: DISPENSED
Start: 2023-06-19

## (undated) RX ORDER — DEXAMETHASONE SODIUM PHOSPHATE 4 MG/ML
INJECTION, SOLUTION INTRA-ARTICULAR; INTRALESIONAL; INTRAMUSCULAR; INTRAVENOUS; SOFT TISSUE
Status: DISPENSED
Start: 2023-06-19